# Patient Record
Sex: FEMALE | Race: WHITE | NOT HISPANIC OR LATINO | URBAN - METROPOLITAN AREA
[De-identification: names, ages, dates, MRNs, and addresses within clinical notes are randomized per-mention and may not be internally consistent; named-entity substitution may affect disease eponyms.]

---

## 2017-02-25 ENCOUNTER — EMERGENCY (EMERGENCY)
Facility: HOSPITAL | Age: 77
LOS: 1 days | Discharge: HOME | End: 2017-02-25
Attending: EMERGENCY MEDICINE | Admitting: EMERGENCY MEDICINE
Payer: COMMERCIAL

## 2017-02-25 VITALS
TEMPERATURE: 98 F | WEIGHT: 110.01 LBS | HEIGHT: 66 IN | OXYGEN SATURATION: 96 % | HEART RATE: 86 BPM | DIASTOLIC BLOOD PRESSURE: 81 MMHG | RESPIRATION RATE: 16 BRPM | SYSTOLIC BLOOD PRESSURE: 149 MMHG

## 2017-02-25 VITALS
SYSTOLIC BLOOD PRESSURE: 145 MMHG | TEMPERATURE: 98 F | RESPIRATION RATE: 16 BRPM | DIASTOLIC BLOOD PRESSURE: 65 MMHG | HEART RATE: 84 BPM | OXYGEN SATURATION: 96 %

## 2017-02-25 DIAGNOSIS — K92.1 MELENA: ICD-10-CM

## 2017-02-25 DIAGNOSIS — I35.0 NONRHEUMATIC AORTIC (VALVE) STENOSIS: ICD-10-CM

## 2017-02-25 DIAGNOSIS — Z88.0 ALLERGY STATUS TO PENICILLIN: ICD-10-CM

## 2017-02-25 DIAGNOSIS — R19.5 OTHER FECAL ABNORMALITIES: ICD-10-CM

## 2017-02-25 DIAGNOSIS — I48.91 UNSPECIFIED ATRIAL FIBRILLATION: ICD-10-CM

## 2017-02-25 LAB
ALBUMIN SERPL ELPH-MCNC: 3.3 G/DL — SIGNIFICANT CHANGE UP (ref 3.3–5)
ALP SERPL-CCNC: 93 U/L — SIGNIFICANT CHANGE UP (ref 40–120)
ALT FLD-CCNC: 21 U/L — SIGNIFICANT CHANGE UP (ref 12–78)
ANION GAP SERPL CALC-SCNC: 8 MMOL/L — SIGNIFICANT CHANGE UP (ref 5–17)
APTT BLD: 26.3 SEC — LOW (ref 27.5–37.4)
AST SERPL-CCNC: 19 U/L — SIGNIFICANT CHANGE UP (ref 15–37)
BASOPHILS # BLD AUTO: 0.1 K/UL — SIGNIFICANT CHANGE UP (ref 0–0.2)
BASOPHILS NFR BLD AUTO: 0.7 % — SIGNIFICANT CHANGE UP (ref 0–2)
BILIRUB SERPL-MCNC: 0.2 MG/DL — SIGNIFICANT CHANGE UP (ref 0.2–1.2)
BUN SERPL-MCNC: 14 MG/DL — SIGNIFICANT CHANGE UP (ref 7–23)
CALCIUM SERPL-MCNC: 9.1 MG/DL — SIGNIFICANT CHANGE UP (ref 8.5–10.1)
CHLORIDE SERPL-SCNC: 110 MMOL/L — HIGH (ref 96–108)
CO2 SERPL-SCNC: 25 MMOL/L — SIGNIFICANT CHANGE UP (ref 22–31)
CREAT SERPL-MCNC: 0.37 MG/DL — LOW (ref 0.5–1.3)
EOSINOPHIL # BLD AUTO: 0 K/UL — SIGNIFICANT CHANGE UP (ref 0–0.5)
EOSINOPHIL NFR BLD AUTO: 0.4 % — SIGNIFICANT CHANGE UP (ref 0–6)
GLUCOSE SERPL-MCNC: 93 MG/DL — SIGNIFICANT CHANGE UP (ref 70–99)
HCT VFR BLD CALC: 37.3 % — SIGNIFICANT CHANGE UP (ref 34.5–45)
HGB BLD-MCNC: 11.9 G/DL — SIGNIFICANT CHANGE UP (ref 11.5–15.5)
INR BLD: 1.08 RATIO — SIGNIFICANT CHANGE UP (ref 0.88–1.16)
LIDOCAIN IGE QN: 128 U/L — SIGNIFICANT CHANGE UP (ref 73–393)
LYMPHOCYTES # BLD AUTO: 0.9 K/UL — LOW (ref 1–3.3)
LYMPHOCYTES # BLD AUTO: 10 % — LOW (ref 13–44)
MCHC RBC-ENTMCNC: 27.2 PG — SIGNIFICANT CHANGE UP (ref 27–34)
MCHC RBC-ENTMCNC: 31.8 GM/DL — LOW (ref 32–36)
MCV RBC AUTO: 85.4 FL — SIGNIFICANT CHANGE UP (ref 80–100)
MONOCYTES # BLD AUTO: 0.4 K/UL — SIGNIFICANT CHANGE UP (ref 0–0.9)
MONOCYTES NFR BLD AUTO: 4 % — SIGNIFICANT CHANGE UP (ref 1–9)
NEUTROPHILS # BLD AUTO: 7.9 K/UL — HIGH (ref 1.8–7.4)
NEUTROPHILS NFR BLD AUTO: 85 % — HIGH (ref 43–77)
OB PNL STL: NEGATIVE — SIGNIFICANT CHANGE UP
PLATELET # BLD AUTO: 289 K/UL — SIGNIFICANT CHANGE UP (ref 150–400)
POTASSIUM SERPL-MCNC: 3.5 MMOL/L — SIGNIFICANT CHANGE UP (ref 3.5–5.3)
POTASSIUM SERPL-SCNC: 3.5 MMOL/L — SIGNIFICANT CHANGE UP (ref 3.5–5.3)
PROT SERPL-MCNC: 7.1 G/DL — SIGNIFICANT CHANGE UP (ref 6–8.3)
PROTHROM AB SERPL-ACNC: 12 SEC — SIGNIFICANT CHANGE UP (ref 10–13.1)
RBC # BLD: 4.37 M/UL — SIGNIFICANT CHANGE UP (ref 3.8–5.2)
RBC # FLD: 14.6 % — HIGH (ref 10.3–14.5)
SODIUM SERPL-SCNC: 143 MMOL/L — SIGNIFICANT CHANGE UP (ref 135–145)
WBC # BLD: 9.3 K/UL — SIGNIFICANT CHANGE UP (ref 3.8–10.5)
WBC # FLD AUTO: 9.3 K/UL — SIGNIFICANT CHANGE UP (ref 3.8–10.5)

## 2017-02-25 PROCEDURE — 85610 PROTHROMBIN TIME: CPT

## 2017-02-25 PROCEDURE — 71045 X-RAY EXAM CHEST 1 VIEW: CPT

## 2017-02-25 PROCEDURE — 82272 OCCULT BLD FECES 1-3 TESTS: CPT

## 2017-02-25 PROCEDURE — 86901 BLOOD TYPING SEROLOGIC RH(D): CPT

## 2017-02-25 PROCEDURE — 71010: CPT | Mod: 26

## 2017-02-25 PROCEDURE — 99285 EMERGENCY DEPT VISIT HI MDM: CPT

## 2017-02-25 PROCEDURE — 85730 THROMBOPLASTIN TIME PARTIAL: CPT

## 2017-02-25 PROCEDURE — 83690 ASSAY OF LIPASE: CPT

## 2017-02-25 PROCEDURE — 93005 ELECTROCARDIOGRAM TRACING: CPT

## 2017-02-25 PROCEDURE — 85027 COMPLETE CBC AUTOMATED: CPT

## 2017-02-25 PROCEDURE — 86900 BLOOD TYPING SEROLOGIC ABO: CPT

## 2017-02-25 PROCEDURE — 86850 RBC ANTIBODY SCREEN: CPT

## 2017-02-25 PROCEDURE — 99284 EMERGENCY DEPT VISIT MOD MDM: CPT | Mod: 25

## 2017-02-25 PROCEDURE — 80053 COMPREHEN METABOLIC PANEL: CPT

## 2017-02-25 RX ORDER — SODIUM CHLORIDE 9 MG/ML
1000 INJECTION INTRAMUSCULAR; INTRAVENOUS; SUBCUTANEOUS ONCE
Qty: 0 | Refills: 0 | Status: COMPLETED | OUTPATIENT
Start: 2017-02-25 | End: 2017-02-25

## 2017-02-25 RX ADMIN — SODIUM CHLORIDE 1000 MILLILITER(S): 9 INJECTION INTRAMUSCULAR; INTRAVENOUS; SUBCUTANEOUS at 19:22

## 2017-02-25 NOTE — ED PROVIDER NOTE - PROGRESS NOTE DETAILS
declining protonix labs and imaging explained to patient, abd soft, nt, nd, CT a/p offered to patient to r/o infection, patient declined, wants to f/u with Dr. Oneal and wants to go to her party at [facility].
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
## 2017-02-25 NOTE — ED ADULT NURSE NOTE - OBJECTIVE STATEMENT
76 year old female alert and oriented x3. Pt complaining of dark red stools x2 days.  Pt also complaining of weakness and dizziness.  Lung sounds clear and bilateral. Pt c/o RUQ and LUQ pain no abdominal distension x4 quadrants.  pulse motor sensory intact x4 extremities.  Limited range of motion to right upper extremity s/p previous fracture and surgery. 76 year old female alert and oriented x3. Pt complaining of dark red stools x2 days.  Pt also complaining of weakness and dizziness.  Lung sounds clear and bilateral. Pt no abdominal pain/ distension x4 quadrants.  pulse motor sensory intact x4 extremities.  Limited range of motion to right upper extremity s/p previous fracture and surgery. 76 year old female alert and oriented x3. Pt complaining of dark red stools x2 days.  Pt also complaining of weakness and dizziness.  Lung sounds clear and bilateral. Pt no abdominal pain/ distension x4 quadrants.  pulse motor sensory intact x4 extremities.  Limited range of motion to right upper extremity s/p previous fracture and surgery.  CAM boot to right lower extremity.

## 2017-02-25 NOTE — ED ADULT NURSE REASSESSMENT NOTE - NS ED NURSE REASSESS COMMENT FT1
received patient from off going RN Yue Holden.  patient presents alert and oriented X4. patient denies dizziness, abdominal pain, shortness of breath, chest pain.  patient ambulated in ED, steady gait.  positive b/l pedal pulses
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
## 2017-05-27 ENCOUNTER — INPATIENT (INPATIENT)
Facility: HOSPITAL | Age: 77
LOS: 4 days | Discharge: HOME | DRG: 378 | End: 2017-06-01
Attending: FAMILY MEDICINE | Admitting: FAMILY MEDICINE
Payer: MEDICARE

## 2017-05-27 VITALS
RESPIRATION RATE: 15 BRPM | HEIGHT: 66 IN | DIASTOLIC BLOOD PRESSURE: 50 MMHG | OXYGEN SATURATION: 94 % | WEIGHT: 110.01 LBS | SYSTOLIC BLOOD PRESSURE: 111 MMHG | TEMPERATURE: 98 F

## 2017-05-27 DIAGNOSIS — K62.5 HEMORRHAGE OF ANUS AND RECTUM: ICD-10-CM

## 2017-05-27 LAB
ALBUMIN SERPL ELPH-MCNC: 2.5 G/DL — LOW (ref 3.3–5)
ALLERGY+IMMUNOLOGY DIAG STUDY NOTE: SIGNIFICANT CHANGE UP
ALP SERPL-CCNC: 82 U/L — SIGNIFICANT CHANGE UP (ref 40–120)
ALT FLD-CCNC: 13 U/L — SIGNIFICANT CHANGE UP (ref 12–78)
ANION GAP SERPL CALC-SCNC: 11 MMOL/L — SIGNIFICANT CHANGE UP (ref 5–17)
APPEARANCE UR: CLEAR — SIGNIFICANT CHANGE UP
APTT BLD: 22.4 SEC — LOW (ref 27.5–37.4)
AST SERPL-CCNC: 10 U/L — LOW (ref 15–37)
BACTERIA # UR AUTO: ABNORMAL
BASOPHILS # BLD AUTO: 0.1 K/UL — SIGNIFICANT CHANGE UP (ref 0–0.2)
BASOPHILS NFR BLD AUTO: 0.4 % — SIGNIFICANT CHANGE UP (ref 0–2)
BILIRUB SERPL-MCNC: 0.2 MG/DL — SIGNIFICANT CHANGE UP (ref 0.2–1.2)
BILIRUB UR-MCNC: NEGATIVE — SIGNIFICANT CHANGE UP
BLD GP AB SCN SERPL QL: ABNORMAL
BUN SERPL-MCNC: 31 MG/DL — HIGH (ref 7–23)
CALCIUM SERPL-MCNC: 8.4 MG/DL — LOW (ref 8.5–10.1)
CHLORIDE SERPL-SCNC: 110 MMOL/L — HIGH (ref 96–108)
CO2 SERPL-SCNC: 24 MMOL/L — SIGNIFICANT CHANGE UP (ref 22–31)
COLOR SPEC: YELLOW — SIGNIFICANT CHANGE UP
CREAT SERPL-MCNC: 0.57 MG/DL — SIGNIFICANT CHANGE UP (ref 0.5–1.3)
DIFF PNL FLD: ABNORMAL
DIR ANTIGLOB POLYSPECIFIC INTERPRETATION: SIGNIFICANT CHANGE UP
EOSINOPHIL # BLD AUTO: 0.2 K/UL — SIGNIFICANT CHANGE UP (ref 0–0.5)
EOSINOPHIL NFR BLD AUTO: 0.9 % — SIGNIFICANT CHANGE UP (ref 0–6)
EPI CELLS # UR: SIGNIFICANT CHANGE UP
GLUCOSE SERPL-MCNC: 174 MG/DL — HIGH (ref 70–99)
GLUCOSE UR QL: NEGATIVE — SIGNIFICANT CHANGE UP
HCT VFR BLD CALC: 26.5 % — LOW (ref 34.5–45)
HCT VFR BLD CALC: 28.3 % — LOW (ref 34.5–45)
HGB BLD-MCNC: 8.4 G/DL — LOW (ref 11.5–15.5)
HGB BLD-MCNC: 9 G/DL — LOW (ref 11.5–15.5)
INR BLD: 1.16 RATIO — SIGNIFICANT CHANGE UP (ref 0.88–1.16)
KETONES UR-MCNC: NEGATIVE — SIGNIFICANT CHANGE UP
LACTATE SERPL-SCNC: 0.9 MMOL/L — SIGNIFICANT CHANGE UP (ref 0.7–2)
LACTATE SERPL-SCNC: 2.4 MMOL/L — HIGH (ref 0.7–2)
LEUKOCYTE ESTERASE UR-ACNC: ABNORMAL
LYMPHOCYTES # BLD AUTO: 1.5 K/UL — SIGNIFICANT CHANGE UP (ref 1–3.3)
LYMPHOCYTES # BLD AUTO: 8.5 % — LOW (ref 13–44)
MCHC RBC-ENTMCNC: 27.3 PG — SIGNIFICANT CHANGE UP (ref 27–34)
MCHC RBC-ENTMCNC: 27.9 PG — SIGNIFICANT CHANGE UP (ref 27–34)
MCHC RBC-ENTMCNC: 31.6 GM/DL — LOW (ref 32–36)
MCHC RBC-ENTMCNC: 31.9 GM/DL — LOW (ref 32–36)
MCV RBC AUTO: 86.2 FL — SIGNIFICANT CHANGE UP (ref 80–100)
MCV RBC AUTO: 87.4 FL — SIGNIFICANT CHANGE UP (ref 80–100)
MONOCYTES # BLD AUTO: 1 K/UL — HIGH (ref 0–0.9)
MONOCYTES NFR BLD AUTO: 5.9 % — SIGNIFICANT CHANGE UP (ref 1–9)
NEUTROPHILS # BLD AUTO: 14.7 K/UL — HIGH (ref 1.8–7.4)
NEUTROPHILS NFR BLD AUTO: 84.3 % — HIGH (ref 43–77)
NITRITE UR-MCNC: NEGATIVE — SIGNIFICANT CHANGE UP
NT-PROBNP SERPL-SCNC: 679 PG/ML — HIGH (ref 0–450)
PH UR: 7 — SIGNIFICANT CHANGE UP (ref 5–8)
PLATELET # BLD AUTO: 334 K/UL — SIGNIFICANT CHANGE UP (ref 150–400)
PLATELET # BLD AUTO: 373 K/UL — SIGNIFICANT CHANGE UP (ref 150–400)
POTASSIUM SERPL-MCNC: 4 MMOL/L — SIGNIFICANT CHANGE UP (ref 3.5–5.3)
POTASSIUM SERPL-SCNC: 4 MMOL/L — SIGNIFICANT CHANGE UP (ref 3.5–5.3)
PROT SERPL-MCNC: 5.7 G/DL — LOW (ref 6–8.3)
PROT UR-MCNC: NEGATIVE — SIGNIFICANT CHANGE UP
PROTHROM AB SERPL-ACNC: 12.7 SEC — SIGNIFICANT CHANGE UP (ref 9.8–12.7)
RBC # BLD: 3.08 M/UL — LOW (ref 3.8–5.2)
RBC # BLD: 3.24 M/UL — LOW (ref 3.8–5.2)
RBC # FLD: 14.9 % — HIGH (ref 10.3–14.5)
RBC # FLD: 15.2 % — HIGH (ref 10.3–14.5)
RBC CASTS # UR COMP ASSIST: ABNORMAL /HPF (ref 0–4)
SODIUM SERPL-SCNC: 145 MMOL/L — SIGNIFICANT CHANGE UP (ref 135–145)
SP GR SPEC: 1 — LOW (ref 1.01–1.02)
UROBILINOGEN FLD QL: NEGATIVE — SIGNIFICANT CHANGE UP
WBC # BLD: 14.9 K/UL — HIGH (ref 3.8–10.5)
WBC # BLD: 17.4 K/UL — HIGH (ref 3.8–10.5)
WBC # FLD AUTO: 14.9 K/UL — HIGH (ref 3.8–10.5)
WBC # FLD AUTO: 17.4 K/UL — HIGH (ref 3.8–10.5)
WBC UR QL: ABNORMAL

## 2017-05-27 PROCEDURE — 74020: CPT | Mod: 26

## 2017-05-27 PROCEDURE — 93010 ELECTROCARDIOGRAM REPORT: CPT

## 2017-05-27 PROCEDURE — 99285 EMERGENCY DEPT VISIT HI MDM: CPT

## 2017-05-27 PROCEDURE — 71010: CPT | Mod: 26

## 2017-05-27 RX ORDER — MORPHINE SULFATE 50 MG/1
1 CAPSULE, EXTENDED RELEASE ORAL
Qty: 0 | Refills: 0 | Status: DISCONTINUED | OUTPATIENT
Start: 2017-05-27 | End: 2017-06-01

## 2017-05-27 RX ORDER — SODIUM CHLORIDE 9 MG/ML
3 INJECTION INTRAMUSCULAR; INTRAVENOUS; SUBCUTANEOUS ONCE
Qty: 0 | Refills: 0 | Status: COMPLETED | OUTPATIENT
Start: 2017-05-27 | End: 2017-05-27

## 2017-05-27 RX ORDER — FAMOTIDINE 10 MG/ML
20 INJECTION INTRAVENOUS EVERY 12 HOURS
Qty: 0 | Refills: 0 | Status: DISCONTINUED | OUTPATIENT
Start: 2017-05-27 | End: 2017-06-01

## 2017-05-27 RX ORDER — PANTOPRAZOLE SODIUM 20 MG/1
40 TABLET, DELAYED RELEASE ORAL ONCE
Qty: 0 | Refills: 0 | Status: COMPLETED | OUTPATIENT
Start: 2017-05-27 | End: 2017-05-27

## 2017-05-27 RX ADMIN — FAMOTIDINE 20 MILLIGRAM(S): 10 INJECTION INTRAVENOUS at 22:47

## 2017-05-27 RX ADMIN — SODIUM CHLORIDE 3 MILLILITER(S): 9 INJECTION INTRAMUSCULAR; INTRAVENOUS; SUBCUTANEOUS at 19:15

## 2017-05-27 RX ADMIN — MORPHINE SULFATE 1 MILLIGRAM(S): 50 CAPSULE, EXTENDED RELEASE ORAL at 22:43

## 2017-05-27 RX ADMIN — MORPHINE SULFATE 1 MILLIGRAM(S): 50 CAPSULE, EXTENDED RELEASE ORAL at 22:55
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
## 2017-05-27 NOTE — ED PROVIDER NOTE - CONSTITUTIONAL, MLM
normal... Well appearing, pale  awake, alert, oriented to person, place, time/situation and in no apparent distress.
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
## 2017-05-27 NOTE — ED PROVIDER NOTE - CRITICAL CARE INDICATION, MLM
patient was critically ill... Patient was critically ill with a high probability of imminent or life threatening deterioration heavy bleeding abd pian hypotension

## 2017-05-27 NOTE — ED PROVIDER NOTE - OBJECTIVE STATEMENT
PT is a 75 yo f who has hx of idiopathic diarrhea gib gets fe transfusions pmd is her daughter dr Mishra and gi is dr Oneal.  she last had gib 1 year ago was well until today when she had a near syncopal episode. this afternoon she began to have abd cramps and passed a large bloody bm  no cp no sob

## 2017-05-27 NOTE — PATIENT PROFILE ADULT. - NS TRANSFER PATIENT BELONGINGS
Other belongings/purse/wallet/Clothing/Jewelry/Money (specify)/Electronic Device (specify)/Cell Phone/PDA (specify)/Wrist Watch

## 2017-05-27 NOTE — ED PROVIDER NOTE - ENMT, MLM
Airway patent, Nasal mucosa clear. Mouth with pale mucosa. Throat has no vesicles, no oropharyngeal exudates and uvula is midline.

## 2017-05-27 NOTE — ED ADULT NURSE NOTE - OBJECTIVE STATEMENT
c/o rectal bleeding /left sided abd pain today.  states bleed a lot today and has hx of rectal bleeding c/o rectal bleeding /left sided abd pain today.  states bleed a lot today and has hx of rectal bleeding.  rt lower leg foot/leg in immobilizer boot in -place
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
## 2017-05-27 NOTE — ED PROVIDER NOTE - CRITICAL CARE PROVIDED
additional history taking/interpretation of diagnostic studies/documentation/direct patient care (not related to procedure)/consult w/ pt's family directly relating to pts condition/consultation with other physicians

## 2017-05-28 DIAGNOSIS — K62.5 HEMORRHAGE OF ANUS AND RECTUM: ICD-10-CM

## 2017-05-28 LAB
ANION GAP SERPL CALC-SCNC: 8 MMOL/L — SIGNIFICANT CHANGE UP (ref 5–17)
BUN SERPL-MCNC: 16 MG/DL — SIGNIFICANT CHANGE UP (ref 7–23)
CALCIUM SERPL-MCNC: 8.3 MG/DL — LOW (ref 8.5–10.1)
CHLORIDE SERPL-SCNC: 111 MMOL/L — HIGH (ref 96–108)
CO2 SERPL-SCNC: 27 MMOL/L — SIGNIFICANT CHANGE UP (ref 22–31)
CREAT SERPL-MCNC: 0.29 MG/DL — LOW (ref 0.5–1.3)
GLUCOSE SERPL-MCNC: 92 MG/DL — SIGNIFICANT CHANGE UP (ref 70–99)
HBA1C BLD-MCNC: 5.3 % — SIGNIFICANT CHANGE UP (ref 4–5.6)
HCT VFR BLD CALC: 23.4 % — LOW (ref 34.5–45)
HCT VFR BLD CALC: 25.6 % — LOW (ref 34.5–45)
HCT VFR BLD CALC: 27.8 % — LOW (ref 34.5–45)
HGB BLD-MCNC: 7.5 G/DL — LOW (ref 11.5–15.5)
HGB BLD-MCNC: 8.3 G/DL — LOW (ref 11.5–15.5)
HGB BLD-MCNC: 8.7 G/DL — LOW (ref 11.5–15.5)
LACTATE SERPL-SCNC: 0.6 MMOL/L — LOW (ref 0.7–2)
MCHC RBC-ENTMCNC: 26.8 PG — LOW (ref 27–34)
MCHC RBC-ENTMCNC: 27.3 PG — SIGNIFICANT CHANGE UP (ref 27–34)
MCHC RBC-ENTMCNC: 27.8 PG — SIGNIFICANT CHANGE UP (ref 27–34)
MCHC RBC-ENTMCNC: 31.3 GM/DL — LOW (ref 32–36)
MCHC RBC-ENTMCNC: 32.2 GM/DL — SIGNIFICANT CHANGE UP (ref 32–36)
MCHC RBC-ENTMCNC: 32.6 GM/DL — SIGNIFICANT CHANGE UP (ref 32–36)
MCV RBC AUTO: 83.7 FL — SIGNIFICANT CHANGE UP (ref 80–100)
MCV RBC AUTO: 85.6 FL — SIGNIFICANT CHANGE UP (ref 80–100)
MCV RBC AUTO: 86.4 FL — SIGNIFICANT CHANGE UP (ref 80–100)
PLATELET # BLD AUTO: 252 K/UL — SIGNIFICANT CHANGE UP (ref 150–400)
PLATELET # BLD AUTO: 275 K/UL — SIGNIFICANT CHANGE UP (ref 150–400)
PLATELET # BLD AUTO: 293 K/UL — SIGNIFICANT CHANGE UP (ref 150–400)
POTASSIUM SERPL-MCNC: 3.5 MMOL/L — SIGNIFICANT CHANGE UP (ref 3.5–5.3)
POTASSIUM SERPL-SCNC: 3.5 MMOL/L — SIGNIFICANT CHANGE UP (ref 3.5–5.3)
RBC # BLD: 2.7 M/UL — LOW (ref 3.8–5.2)
RBC # BLD: 3.06 M/UL — LOW (ref 3.8–5.2)
RBC # BLD: 3.25 M/UL — LOW (ref 3.8–5.2)
RBC # FLD: 13.8 % — SIGNIFICANT CHANGE UP (ref 10.3–14.5)
RBC # FLD: 14 % — SIGNIFICANT CHANGE UP (ref 10.3–14.5)
RBC # FLD: 15.1 % — HIGH (ref 10.3–14.5)
SODIUM SERPL-SCNC: 146 MMOL/L — HIGH (ref 135–145)
WBC # BLD: 5.8 K/UL — SIGNIFICANT CHANGE UP (ref 3.8–10.5)
WBC # BLD: 6.3 K/UL — SIGNIFICANT CHANGE UP (ref 3.8–10.5)
WBC # BLD: 7.6 K/UL — SIGNIFICANT CHANGE UP (ref 3.8–10.5)
WBC # FLD AUTO: 5.8 K/UL — SIGNIFICANT CHANGE UP (ref 3.8–10.5)
WBC # FLD AUTO: 6.3 K/UL — SIGNIFICANT CHANGE UP (ref 3.8–10.5)
WBC # FLD AUTO: 7.6 K/UL — SIGNIFICANT CHANGE UP (ref 3.8–10.5)

## 2017-05-28 PROCEDURE — 99233 SBSQ HOSP IP/OBS HIGH 50: CPT

## 2017-05-28 RX ORDER — SODIUM CHLORIDE 9 MG/ML
1000 INJECTION INTRAMUSCULAR; INTRAVENOUS; SUBCUTANEOUS
Qty: 0 | Refills: 0 | Status: DISCONTINUED | OUTPATIENT
Start: 2017-05-28 | End: 2017-05-29

## 2017-05-28 RX ADMIN — SODIUM CHLORIDE 75 MILLILITER(S): 9 INJECTION INTRAMUSCULAR; INTRAVENOUS; SUBCUTANEOUS at 09:26

## 2017-05-28 RX ADMIN — MORPHINE SULFATE 1 MILLIGRAM(S): 50 CAPSULE, EXTENDED RELEASE ORAL at 05:27

## 2017-05-28 RX ADMIN — FAMOTIDINE 20 MILLIGRAM(S): 10 INJECTION INTRAVENOUS at 22:31

## 2017-05-28 RX ADMIN — MORPHINE SULFATE 1 MILLIGRAM(S): 50 CAPSULE, EXTENDED RELEASE ORAL at 19:51

## 2017-05-28 RX ADMIN — SODIUM CHLORIDE 75 MILLILITER(S): 9 INJECTION INTRAMUSCULAR; INTRAVENOUS; SUBCUTANEOUS at 22:31

## 2017-05-28 RX ADMIN — MORPHINE SULFATE 1 MILLIGRAM(S): 50 CAPSULE, EXTENDED RELEASE ORAL at 22:31

## 2017-05-28 RX ADMIN — MORPHINE SULFATE 1 MILLIGRAM(S): 50 CAPSULE, EXTENDED RELEASE ORAL at 02:15

## 2017-05-28 RX ADMIN — MORPHINE SULFATE 1 MILLIGRAM(S): 50 CAPSULE, EXTENDED RELEASE ORAL at 01:43

## 2017-05-28 RX ADMIN — MORPHINE SULFATE 1 MILLIGRAM(S): 50 CAPSULE, EXTENDED RELEASE ORAL at 01:15

## 2017-05-28 RX ADMIN — MORPHINE SULFATE 1 MILLIGRAM(S): 50 CAPSULE, EXTENDED RELEASE ORAL at 14:14

## 2017-05-28 RX ADMIN — FAMOTIDINE 20 MILLIGRAM(S): 10 INJECTION INTRAVENOUS at 09:35

## 2017-05-28 RX ADMIN — MORPHINE SULFATE 1 MILLIGRAM(S): 50 CAPSULE, EXTENDED RELEASE ORAL at 22:45

## 2017-05-28 RX ADMIN — MORPHINE SULFATE 1 MILLIGRAM(S): 50 CAPSULE, EXTENDED RELEASE ORAL at 09:09

## 2017-05-28 RX ADMIN — MORPHINE SULFATE 1 MILLIGRAM(S): 50 CAPSULE, EXTENDED RELEASE ORAL at 01:00

## 2017-05-28 RX ADMIN — MORPHINE SULFATE 1 MILLIGRAM(S): 50 CAPSULE, EXTENDED RELEASE ORAL at 20:05

## 2017-05-28 RX ADMIN — MORPHINE SULFATE 1 MILLIGRAM(S): 50 CAPSULE, EXTENDED RELEASE ORAL at 05:40

## 2017-05-28 RX ADMIN — MORPHINE SULFATE 1 MILLIGRAM(S): 50 CAPSULE, EXTENDED RELEASE ORAL at 14:29

## 2017-05-28 RX ADMIN — MORPHINE SULFATE 1 MILLIGRAM(S): 50 CAPSULE, EXTENDED RELEASE ORAL at 08:36

## 2017-05-28 NOTE — CONSULT NOTE ADULT - ATTENDING COMMENTS
pt seen and examined  chart reviewed  s/p 1 unit prbc for declining hemoglobin (no further hematochezia)  s/p 1 unit platelet for chronic asa use  either small bowel or colonic diverticular bleed  recommend  serial cbc  transfuse as needed  npo  may need endoscopic evaluation if bleeding persist but will need cardiac clearance given severe AS and severe pulm htn

## 2017-05-28 NOTE — PROGRESS NOTE ADULT - SUBJECTIVE AND OBJECTIVE BOX
24 hour events: feels better after getting blood transfusion  no other complaints  no bleeding since coming to the hospital    REVIEW OF SYSTEMS  Constitutional: No fever, chills, fatigue  Neuro: No headache, numbness, weakness  Resp: No cough, wheezing, shortness of breath  CVS: No chest pain, palpitations, leg swelling  GI: left lower abdominal pain, no nausea, vomiting, diarrhea   : No dysuria, frequency, incontinence  Skin: No itching, burning, rashes, or lesions   Msk: No joint pain or swelling  Psych: No depression, anxiety, mood swings    T(F): 98.1, Max: 98.1 ( @ 19:00)  HR: 85 (74 - 94)  BP: 131/58 (89/51 - 131/60)  RR: 41 (11 - 41)  SpO2: 100% (94% - 100%)    I&O's Summary  I & Os for 24h ending  @ 07:00  =============================================  IN: 200 ml / OUT: 600 ml / NET: -400 ml    I & Os for current day (as of  @ 12:32)  =============================================  IN: 380 ml / OUT: 800 ml / NET: -420 ml    PHYSICAL EXAM  General: elderly female, NAD  CNS: AAO x 3, no focal deficits  HEENT: pupils reactive, dry mucosa  Resp: clear b/l  CVS: S1S2, regular, 4/6 SM  Abd: soft, LLQ tenderness, +BS, no rebound/guarding  Ext: no edema, right leg in a brace  Skin: warm    MEDICATIONS  morphine  - Injectable IV Push PRN  famotidine Injectable IV Push  sodium chloride 0.9%. IV Continuous                        7.5    7.6   )-----------( 252      ( 28 May 2017 07:35 )             23.4         146<H>  |  111<H>  |  16  ----------------------------<  92  3.5   |  27  |  0.29<L>    Ca    8.3<L>      28 May 2017 07:35    TPro  5.7<L>  /  Alb  2.5<L>  /  TBili  0.2  /  DBili  x   /  AST  10<L>  /  ALT  13  /  AlkPhos  82      Lactate 0.6            @ 07:35    Lactate 0.9            @ 21:45    Lactate 2.4            @ 19:08    PT/INR - ( 27 May 2017 19:08 )   PT: 12.7 sec;   INR: 1.16 ratio       PTT - ( 27 May 2017 19:08 )  PTT:22.4 sec  Urinalysis Basic - ( 27 May 2017 22:05 )    Color: Yellow / Appearance: Clear / S.005 / pH: x  Gluc: x / Ketone: Negative  / Bili: Negative / Urobili: Negative   Blood: x / Protein: Negative / Nitrite: Negative   Leuk Esterase: Trace / RBC: 6-10 /HPF / WBC 6-10   Sq Epi: x / Non Sq Epi: Occasional / Bacteria: Moderate    CENTRAL LINE: N          LOPEZ: N                  A-LINE: N                    GLOBAL ISSUE/BEST PRACTICE  Analgesia: Y  Sedation: NA  CAM-ICU: neg  HOB elevation: yes  Stress ulcer prophylaxis: Y  VTE prophylaxis: N (active bleeding)  Glycemic control: NA  Nutrition: N    CODE STATUS: full

## 2017-05-28 NOTE — DIETITIAN INITIAL EVALUATION ADULT. - OTHER INFO
Pt states that her UBW is 125# and currently weighs 110#. Wt in hospital is 124.3#. Pt also states that she has diarrhea after eating. She feels fine when she doesn't eat anything. She wishes she doesn't have to eat. Pt can NOT tolerate Ensure or any other supplement. Pt also states that she has had many upper endos/colons in past without any reason as to why she is unable to tolerate food. Pt currently receiving blood transfusion at time of interview this am.
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
## 2017-05-28 NOTE — CONSULT NOTE ADULT - PROBLEM SELECTOR RECOMMENDATION 2
will monitor stools for evidence of further GIB  off ASA  currently being transfused  transfuse as needed
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
## 2017-05-29 LAB
ANION GAP SERPL CALC-SCNC: 13 MMOL/L — SIGNIFICANT CHANGE UP (ref 5–17)
BUN SERPL-MCNC: 10 MG/DL — SIGNIFICANT CHANGE UP (ref 7–23)
CALCIUM SERPL-MCNC: 8.3 MG/DL — LOW (ref 8.5–10.1)
CHLORIDE SERPL-SCNC: 109 MMOL/L — HIGH (ref 96–108)
CO2 SERPL-SCNC: 24 MMOL/L — SIGNIFICANT CHANGE UP (ref 22–31)
CREAT SERPL-MCNC: 0.3 MG/DL — LOW (ref 0.5–1.3)
GLUCOSE SERPL-MCNC: 75 MG/DL — SIGNIFICANT CHANGE UP (ref 70–99)
HCT VFR BLD CALC: 26.3 % — LOW (ref 34.5–45)
HGB BLD-MCNC: 8.4 G/DL — LOW (ref 11.5–15.5)
MAGNESIUM SERPL-MCNC: 2 MG/DL — SIGNIFICANT CHANGE UP (ref 1.6–2.6)
MCHC RBC-ENTMCNC: 27.6 PG — SIGNIFICANT CHANGE UP (ref 27–34)
MCHC RBC-ENTMCNC: 31.9 GM/DL — LOW (ref 32–36)
MCV RBC AUTO: 86.6 FL — SIGNIFICANT CHANGE UP (ref 80–100)
PHOSPHATE SERPL-MCNC: 3.1 MG/DL — SIGNIFICANT CHANGE UP (ref 2.5–4.5)
PLATELET # BLD AUTO: 266 K/UL — SIGNIFICANT CHANGE UP (ref 150–400)
POTASSIUM SERPL-MCNC: 3.4 MMOL/L — LOW (ref 3.5–5.3)
POTASSIUM SERPL-SCNC: 3.4 MMOL/L — LOW (ref 3.5–5.3)
RBC # BLD: 3.04 M/UL — LOW (ref 3.8–5.2)
RBC # FLD: 14.2 % — SIGNIFICANT CHANGE UP (ref 10.3–14.5)
SODIUM SERPL-SCNC: 146 MMOL/L — HIGH (ref 135–145)
WBC # BLD: 5.9 K/UL — SIGNIFICANT CHANGE UP (ref 3.8–10.5)
WBC # FLD AUTO: 5.9 K/UL — SIGNIFICANT CHANGE UP (ref 3.8–10.5)

## 2017-05-29 PROCEDURE — 99291 CRITICAL CARE FIRST HOUR: CPT

## 2017-05-29 PROCEDURE — 99233 SBSQ HOSP IP/OBS HIGH 50: CPT

## 2017-05-29 RX ORDER — SODIUM CHLORIDE 9 MG/ML
1000 INJECTION, SOLUTION INTRAVENOUS
Qty: 0 | Refills: 0 | Status: DISCONTINUED | OUTPATIENT
Start: 2017-05-29 | End: 2017-05-30

## 2017-05-29 RX ORDER — POTASSIUM CHLORIDE 20 MEQ
20 PACKET (EA) ORAL
Qty: 0 | Refills: 0 | Status: COMPLETED | OUTPATIENT
Start: 2017-05-29 | End: 2017-05-29

## 2017-05-29 RX ORDER — METOPROLOL TARTRATE 50 MG
5 TABLET ORAL ONCE
Qty: 0 | Refills: 0 | Status: COMPLETED | OUTPATIENT
Start: 2017-05-29 | End: 2017-05-29

## 2017-05-29 RX ADMIN — MORPHINE SULFATE 1 MILLIGRAM(S): 50 CAPSULE, EXTENDED RELEASE ORAL at 13:27

## 2017-05-29 RX ADMIN — MORPHINE SULFATE 1 MILLIGRAM(S): 50 CAPSULE, EXTENDED RELEASE ORAL at 03:35

## 2017-05-29 RX ADMIN — SODIUM CHLORIDE 75 MILLILITER(S): 9 INJECTION INTRAMUSCULAR; INTRAVENOUS; SUBCUTANEOUS at 13:04

## 2017-05-29 RX ADMIN — MORPHINE SULFATE 1 MILLIGRAM(S): 50 CAPSULE, EXTENDED RELEASE ORAL at 20:20

## 2017-05-29 RX ADMIN — MORPHINE SULFATE 1 MILLIGRAM(S): 50 CAPSULE, EXTENDED RELEASE ORAL at 08:20

## 2017-05-29 RX ADMIN — MORPHINE SULFATE 1 MILLIGRAM(S): 50 CAPSULE, EXTENDED RELEASE ORAL at 13:43

## 2017-05-29 RX ADMIN — FAMOTIDINE 20 MILLIGRAM(S): 10 INJECTION INTRAVENOUS at 10:56

## 2017-05-29 RX ADMIN — Medication 20 MILLIEQUIVALENT(S): at 10:56

## 2017-05-29 RX ADMIN — Medication 5 MILLIGRAM(S): at 09:23

## 2017-05-29 RX ADMIN — MORPHINE SULFATE 1 MILLIGRAM(S): 50 CAPSULE, EXTENDED RELEASE ORAL at 03:17

## 2017-05-29 RX ADMIN — MORPHINE SULFATE 1 MILLIGRAM(S): 50 CAPSULE, EXTENDED RELEASE ORAL at 08:35

## 2017-05-29 RX ADMIN — MORPHINE SULFATE 1 MILLIGRAM(S): 50 CAPSULE, EXTENDED RELEASE ORAL at 19:50

## 2017-05-29 RX ADMIN — FAMOTIDINE 20 MILLIGRAM(S): 10 INJECTION INTRAVENOUS at 22:15

## 2017-05-29 RX ADMIN — MORPHINE SULFATE 1 MILLIGRAM(S): 50 CAPSULE, EXTENDED RELEASE ORAL at 22:01

## 2017-05-29 RX ADMIN — MORPHINE SULFATE 1 MILLIGRAM(S): 50 CAPSULE, EXTENDED RELEASE ORAL at 22:31

## 2017-05-29 RX ADMIN — Medication 30 MILLILITER(S): at 13:22

## 2017-05-29 RX ADMIN — Medication 20 MILLIEQUIVALENT(S): at 13:01

## 2017-05-29 NOTE — PROGRESS NOTE ADULT - ASSESSMENT
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
## 2017-05-29 NOTE — PROGRESS NOTE ADULT - SUBJECTIVE AND OBJECTIVE BOX
INTERVAL HPI/OVERNIGHT EVENTS:  Pt seen and examined at bedside.  No further bleeding as pt has not had a BM since admission.  Remains NPO    MEDICATIONS  (STANDING):  famotidine Injectable 20milliGRAM(s) IV Push every 12 hours  sodium chloride 0.9%. 1000milliLiter(s) IV Continuous <Continuous>  potassium chloride    Tablet ER 20milliEquivalent(s) Oral every 2 hours    MEDICATIONS  (PRN):  morphine  - Injectable 1milliGRAM(s) IV Push every 1 hour PRN Moderate Pain (4 - 6)      Allergies    penicillin (Unknown)  Plavix (Unknown)  sulfa drugs (Unknown)    Intolerances        ROS:   General:  No wt loss, fevers, chills, night sweats, fatigue,   Eyes:  Good vision, no reported pain  ENT:  No sore throat, pain, runny nose, dysphagia  CV:  No pain, palpitations, hypo/hypertension  Resp:  No dyspnea, cough, tachypnea, wheezing  GI:  No pain, No nausea, No vomiting, No diarrhea, No constipation, No weight loss, No fever, No pruritis, No rectal bleeding, No tarry stools, No dysphagia,  :  No pain, bleeding, incontinence, nocturia  Muscle:  No pain, weakness  Neuro:  No weakness, tingling, memory problems  Psych:  No fatigue, insomnia, mood problems, depression  Endocrine:  No polyuria, polydipsia, cold/heat intolerance  Heme:  No petechiae, ecchymosis, easy bruisability  Skin:  No rash, tattoos, scars, edema      PHYSICAL EXAM:   Vital Signs:  Vital Signs Last 24 Hrs  T(C): 36.5, Max: 36.8 ( @ 15:37)  T(F): 97.7, Max: 98.2 ( @ 15:37)  HR: 121 (66 - 121)  BP: 113/57 (95/50 - 138/56)  BP(mean): 79 (70 - 85)  RR: 16 (11 - 126)  SpO2: 96% (96% - 100%)  Daily     Daily Weight in k.1 (29 May 2017 07:00)    GENERAL:  Appears stated age, well-groomed, well-nourished, no distress  HEENT:  NC/AT,  conjunctivae clear and pink, no thyromegaly, nodules, adenopathy, no JVD, sclera -anicteric  CHEST:  Full & symmetric excursion, no increased effort, breath sounds clear  HEART:  Regular rhythm, S1, S2, no murmur/rub/S3/S4, no abdominal bruit, no edema  ABDOMEN:  Soft, non-tender, non-distended, normoactive bowel sounds,  no masses ,no hepato-splenomegaly, no signs of chronic liver disease  EXTEREMITIES:  no cyanosis,clubbing or edema  SKIN:  No rash/erythema/ecchymoses/petechiae/wounds/abscess/warm/dry  NEURO:  Alert, oriented, no asterixis, no tremor, no encephalopathy      LABS:                        8.4    5.9   )-----------( 266      ( 29 May 2017 07:04 )             26.3     05-    146<H>  |  109<H>  |  10  ----------------------------<  75  3.4<L>   |  24  |  0.30<L>    Ca    8.3<L>      29 May 2017 07:04  Phos  3.1     -  Mg     2.0     -    TPro  5.7<L>  /  Alb  2.5<L>  /  TBili  0.2  /  DBili  x   /  AST  10<L>  /  ALT  13  /  AlkPhos  82  -    PT/INR - ( 27 May 2017 19:08 )   PT: 12.7 sec;   INR: 1.16 ratio         PTT - ( 27 May 2017 19:08 )  PTT:22.4 sec  Urinalysis Basic - ( 27 May 2017 22:05 )    Color: Yellow / Appearance: Clear / S.005 / pH: x  Gluc: x / Ketone: Negative  / Bili: Negative / Urobili: Negative   Blood: x / Protein: Negative / Nitrite: Negative   Leuk Esterase: Trace / RBC: 6-10 /HPF / WBC 6-10   Sq Epi: x / Non Sq Epi: Occasional / Bacteria: Moderate        RADIOLOGY & ADDITIONAL TESTS:

## 2017-05-29 NOTE — PROGRESS NOTE ADULT - SUBJECTIVE AND OBJECTIVE BOX
24 hour events: transfused 1 PRBC yesterday  Afib with RVR this am, normotensive    Review of Systems:  Constitutional: No fever, chills, fatigue  Neuro: No headache, numbness, weakness  Resp: No cough, wheezing, shortness of breath  CVS: No chest pain, palpitations, leg swelling  GI: No abdominal pain, nausea, vomiting, diarrhea   : No dysuria, frequency, incontinence  Skin: No itching, burning, rashes, or lesions   Msk: No joint pain or swelling  Psych: No depression, anxiety, mood swings    T(F): 97.7, Max: 98.2 (05-28 @ 15:37)  HR: 81 (66 - 85)  BP: 113/57 (89/51 - 138/56)  RR: 13 (11 - 126)  SpO2: 98% (96% - 100%)    I&O's Summary    I & Os for current day (as of 29 May 2017 09:06)  =============================================  IN: 1730 ml / OUT: 3400 ml / NET: -1670 ml      Physical Exam:   Gen:  Neuro:  HEENT:  Resp:  CVS:  Abd:  Ext:  Skin:    Meds:  morphine  - Injectable IV Push PRN  famotidine Injectable IV Push  sodium chloride 0.9%. IV Continuous  potassium chloride    Tablet ER Oral                        8.4    5.9   )-----------( 266      ( 29 May 2017 07:04 )             26.3       05-29    146<H>  |  109<H>  |  10  ----------------------------<  75  3.4<L>   |  24  |  0.30<L>    Ca    8.3<L>      29 May 2017 07:04  Phos  3.1     05-29  Mg     2.0     05-29    TPro  5.7<L>  /  Alb  2.5<L>  /  TBili  0.2  /  DBili  x   /  AST  10<L>  /  ALT  13  /  AlkPhos  82  05-27    Radiology: ***    Bedside ultrasound: ***    CENTRAL LINE: N/Y          DATE INSERTED:              REMOVE: Y/N  LOPEZ: N/Y                       DATE INSERTED:              REMOVE: Y/N  A-LINE: N/Y                       DATE INSERTED:              REMOVE: Y/N    GLOBAL ISSUE/BEST PRACTICE:  Analgesia:  Sedation:  CAM-ICU:   HOB elevation: yes  Stress ulcer prophylaxis:  VTE prophylaxis:  Glycemic control:  Nutrition:    CODE STATUS: *** 24 hour events: transfused 1 PRBC yesterday  Afib with RVR this am, normotensive, received lopressor 5 IV without improvement, then spontaneous resolved later  episode of moderate melena this am with trace amount of mahogony    GI: +L sided abdominal pain    T(F): 97.7, Max: 98.2 (05-28 @ 15:37)  HR: 81 (66 - 85)  BP: 113/57 (89/51 - 138/56)  RR: 13 (11 - 126)  SpO2: 98% (96% - 100%)    I&O's Summary    I & Os for current day (as of 29 May 2017 09:06)  =============================================  IN: 1730 ml / OUT: 3400 ml / NET: -1670 ml      Physical Exam:   Gen: NAD  Neuro: A and O x3  Resp: CTABL  CVS: 4/6 sys murmur, loudest at LUSB, radiating to carotids  Abd: soft, NTND  Ext: no edema, R in immobilizer    Meds:  morphine  - Injectable IV Push PRN  famotidine Injectable IV Push  sodium chloride 0.9%. IV Continuous  potassium chloride    Tablet ER Oral                        8.4    5.9   )-----------( 266      ( 29 May 2017 07:04 )             26.3       05-29    146<H>  |  109<H>  |  10  ----------------------------<  75  3.4<L>   |  24  |  0.30<L>    Ca    8.3<L>      29 May 2017 07:04  Phos  3.1     05-29  Mg     2.0     05-29    TPro  5.7<L>  /  Alb  2.5<L>  /  TBili  0.2  /  DBili  x   /  AST  10<L>  /  ALT  13  /  AlkPhos  82  05-27    CENTRAL LINE: N  LOPEZ: N  A-LINE: N    GLOBAL ISSUE/BEST PRACTICE:  Analgesia: N  Sedation:N  CAM-ICU: neg  HOB elevation: yes  Stress ulcer prophylaxis:Y  VTE prophylaxis:Y, SCD  Glycemic control:Y  Nutrition:NPO    CODE STATUS: FULL

## 2017-05-30 DIAGNOSIS — K92.2 GASTROINTESTINAL HEMORRHAGE, UNSPECIFIED: ICD-10-CM

## 2017-05-30 LAB
ANION GAP SERPL CALC-SCNC: 11 MMOL/L — SIGNIFICANT CHANGE UP (ref 5–17)
BUN SERPL-MCNC: 13 MG/DL — SIGNIFICANT CHANGE UP (ref 7–23)
CALCIUM SERPL-MCNC: 8.2 MG/DL — LOW (ref 8.5–10.1)
CHLORIDE SERPL-SCNC: 109 MMOL/L — HIGH (ref 96–108)
CO2 SERPL-SCNC: 25 MMOL/L — SIGNIFICANT CHANGE UP (ref 22–31)
CREAT SERPL-MCNC: 0.31 MG/DL — LOW (ref 0.5–1.3)
GLUCOSE SERPL-MCNC: 77 MG/DL — SIGNIFICANT CHANGE UP (ref 70–99)
HCT VFR BLD CALC: 26.2 % — LOW (ref 34.5–45)
HGB BLD-MCNC: 8.3 G/DL — LOW (ref 11.5–15.5)
MAGNESIUM SERPL-MCNC: 2.1 MG/DL — SIGNIFICANT CHANGE UP (ref 1.6–2.6)
MCHC RBC-ENTMCNC: 26.9 PG — LOW (ref 27–34)
MCHC RBC-ENTMCNC: 31.5 GM/DL — LOW (ref 32–36)
MCV RBC AUTO: 85.5 FL — SIGNIFICANT CHANGE UP (ref 80–100)
PHOSPHATE SERPL-MCNC: 2.8 MG/DL — SIGNIFICANT CHANGE UP (ref 2.5–4.5)
PLATELET # BLD AUTO: 287 K/UL — SIGNIFICANT CHANGE UP (ref 150–400)
POTASSIUM SERPL-MCNC: 3.4 MMOL/L — LOW (ref 3.5–5.3)
POTASSIUM SERPL-SCNC: 3.4 MMOL/L — LOW (ref 3.5–5.3)
RBC # BLD: 3.06 M/UL — LOW (ref 3.8–5.2)
RBC # FLD: 14.2 % — SIGNIFICANT CHANGE UP (ref 10.3–14.5)
SODIUM SERPL-SCNC: 145 MMOL/L — SIGNIFICANT CHANGE UP (ref 135–145)
WBC # BLD: 7.2 K/UL — SIGNIFICANT CHANGE UP (ref 3.8–10.5)
WBC # FLD AUTO: 7.2 K/UL — SIGNIFICANT CHANGE UP (ref 3.8–10.5)

## 2017-05-30 PROCEDURE — 99233 SBSQ HOSP IP/OBS HIGH 50: CPT | Mod: GC

## 2017-05-30 PROCEDURE — 99291 CRITICAL CARE FIRST HOUR: CPT

## 2017-05-30 RX ORDER — SODIUM CHLORIDE 9 MG/ML
1000 INJECTION, SOLUTION INTRAVENOUS
Qty: 0 | Refills: 0 | Status: DISCONTINUED | OUTPATIENT
Start: 2017-05-30 | End: 2017-05-31

## 2017-05-30 RX ORDER — POTASSIUM CHLORIDE 20 MEQ
40 PACKET (EA) ORAL ONCE
Qty: 0 | Refills: 0 | Status: COMPLETED | OUTPATIENT
Start: 2017-05-30 | End: 2017-05-30

## 2017-05-30 RX ORDER — MESALAMINE 400 MG
1000 TABLET, DELAYED RELEASE (ENTERIC COATED) ORAL
Qty: 0 | Refills: 0 | Status: DISCONTINUED | OUTPATIENT
Start: 2017-05-30 | End: 2017-06-01

## 2017-05-30 RX ADMIN — Medication 40 MILLIEQUIVALENT(S): at 08:40

## 2017-05-30 RX ADMIN — Medication 1000 MILLIGRAM(S): at 18:12

## 2017-05-30 RX ADMIN — MORPHINE SULFATE 1 MILLIGRAM(S): 50 CAPSULE, EXTENDED RELEASE ORAL at 16:43

## 2017-05-30 RX ADMIN — MORPHINE SULFATE 1 MILLIGRAM(S): 50 CAPSULE, EXTENDED RELEASE ORAL at 22:26

## 2017-05-30 RX ADMIN — MORPHINE SULFATE 1 MILLIGRAM(S): 50 CAPSULE, EXTENDED RELEASE ORAL at 02:31

## 2017-05-30 RX ADMIN — MORPHINE SULFATE 1 MILLIGRAM(S): 50 CAPSULE, EXTENDED RELEASE ORAL at 08:55

## 2017-05-30 RX ADMIN — MORPHINE SULFATE 1 MILLIGRAM(S): 50 CAPSULE, EXTENDED RELEASE ORAL at 02:01

## 2017-05-30 RX ADMIN — FAMOTIDINE 20 MILLIGRAM(S): 10 INJECTION INTRAVENOUS at 10:30

## 2017-05-30 RX ADMIN — MORPHINE SULFATE 1 MILLIGRAM(S): 50 CAPSULE, EXTENDED RELEASE ORAL at 13:12

## 2017-05-30 RX ADMIN — MORPHINE SULFATE 1 MILLIGRAM(S): 50 CAPSULE, EXTENDED RELEASE ORAL at 12:56

## 2017-05-30 RX ADMIN — MORPHINE SULFATE 1 MILLIGRAM(S): 50 CAPSULE, EXTENDED RELEASE ORAL at 22:52

## 2017-05-30 RX ADMIN — MORPHINE SULFATE 1 MILLIGRAM(S): 50 CAPSULE, EXTENDED RELEASE ORAL at 17:00

## 2017-05-30 RX ADMIN — FAMOTIDINE 20 MILLIGRAM(S): 10 INJECTION INTRAVENOUS at 22:26

## 2017-05-30 RX ADMIN — MORPHINE SULFATE 1 MILLIGRAM(S): 50 CAPSULE, EXTENDED RELEASE ORAL at 22:56

## 2017-05-30 RX ADMIN — MORPHINE SULFATE 1 MILLIGRAM(S): 50 CAPSULE, EXTENDED RELEASE ORAL at 08:41
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
## 2017-05-30 NOTE — PROGRESS NOTE ADULT - SUBJECTIVE AND OBJECTIVE BOX
Interval events: afebrile overnight.  Tolerating only sips of broth.  2 small tarry stools this AM.      Review of Systems:  Constitutional: no fever, chills, fatigue  Neuro: no headache, numbness, weakness  Resp: no cough, wheezing, shortness of breath  CVS: no chest pain, palpitations, leg swelling  GI: no abdominal pain, nausea, vomiting, diarrhea   : no dysuria, frequency, incontinence  Skin: no itching, burning, rashes, or lesions   Msk: no joint pain or swelling  Psych: no depression, anxiety    T(F): 97.8, Max: 98.5 (05-29 @ 20:15)  HR: 75 (67 - 81)  BP: 106/50 (92/50 - 114/51)  RR: 17 (10 - 30)  SpO2: 95% (93% - 100%)  Wt(kg): --        CAPILLARY BLOOD GLUCOSE    I&O's Summary  I & Os for 24h ending 30 May 2017 07:00  =============================================  IN: 1860 ml / OUT: 1600 ml / NET: 260 ml    I & Os for current day (as of 30 May 2017 15:00)  =============================================  IN: 345 ml / OUT: 800 ml / NET: -455 ml      Physical Exam:     Gen: NAD  Neuro: AAO x 3  HEENT: NCAT  CV: +systolic murmur. RRR, +S1, +S2  Pulm: Lungs CTA B/L, no wheezes, rales, rhonchi  GI: +BS x 4 QUADS, soft, tender to palpation LLQ. NO guarding.   Ext: R lower leg brace.  Compression stockings.       Meds:  morphine  - Injectable 1milliGRAM(s) IV Push every 1 hour PRN  famotidine Injectable 20milliGRAM(s) IV Push every 12 hours  aluminum hydroxide/magnesium hydroxide/simethicone Suspension 30milliLiter(s) Oral every 6 hours PRN  lactated ringers. 1000milliLiter(s) IV Continuous <Continuous>                          8.3    7.2   )-----------( 287      ( 30 May 2017 05:46 )             26.2       05-30    145  |  109<H>  |  13  ----------------------------<  77  3.4<L>   |  25  |  0.31<L>    Ca    8.2<L>      30 May 2017 05:46  Phos  2.8     05-30  Mg     2.1     05-30    CENTRAL LINE: N   DATE INSERTED:   REMOVE: Y/N    LOPEZ: N      DATE INSERTED:        REMOVE: Y/N    A-LINE: N     DATE INSERTED:              REMOVE: Y/N    GLOBAL ISSUE/BEST PRACTICE:  Analgesia: Morphine  Sedation: None  HOB elevation: yes  Stress ulcer prophylaxis: Famotidine  VTE prophylaxis: SCDs  Glycemic control: None  Nutrition: Clear liquid    CODE STATUS: *** Interval events: afebrile overnight.  Tolerating only sips of broth.  2 small tarry stools this AM.      Review of Systems:  Constitutional: no fever, chills  Neuro: no headache, lightheadedness, dizziness  Resp: no cough, wheezing, shortness of breath  CVS: no chest pain, palpitations, leg swelling  GI: abdominal pain (relieved by Morphine).  No nausea, vomiting, diarrhea   : no dysuria, frequency, incontinence  Msk: ongoing joint pain right shoulder      T(F): 97.8, Max: 98.5 (05-29 @ 20:15)  HR: 75 (67 - 81)  BP: 106/50 (92/50 - 114/51)  RR: 17 (10 - 30)  SpO2: 95% (93% - 100%)  Wt(kg): --        CAPILLARY BLOOD GLUCOSE    I&O's Summary  I & Os for 24h ending 30 May 2017 07:00  =============================================  IN: 1860 ml / OUT: 1600 ml / NET: 260 ml    I & Os for current day (as of 30 May 2017 15:00)  =============================================  IN: 345 ml / OUT: 800 ml / NET: -455 ml      Physical Exam:     Gen: NAD  Neuro: AAO x 3  HEENT: NCAT  CV: +systolic murmur. RRR, +S1, +S2  Pulm: Lungs CTA B/L, no wheezes, rales, rhonchi  GI: +BS x 4 QUADS, soft, tender to palpation LLQ. NO guarding.   Ext: R lower leg brace.  Compression stockings.       Meds:  morphine  - Injectable 1milliGRAM(s) IV Push every 1 hour PRN  famotidine Injectable 20milliGRAM(s) IV Push every 12 hours  aluminum hydroxide/magnesium hydroxide/simethicone Suspension 30milliLiter(s) Oral every 6 hours PRN  lactated ringers. 1000milliLiter(s) IV Continuous <Continuous>                          8.3    7.2   )-----------( 287      ( 30 May 2017 05:46 )             26.2       05-30    145  |  109<H>  |  13  ----------------------------<  75  3.4<L>   |  25  |  0.31<L>    Ca    8.2<L>      30 May 2017 05:46  Phos  2.8     05-30  Mg     2.1     05-30    CENTRAL LINE: N   DATE INSERTED:   REMOVE: Y/N    LOPEZ: N      DATE INSERTED:        REMOVE: Y/N    A-LINE: N     DATE INSERTED:              REMOVE: Y/N    GLOBAL ISSUE/BEST PRACTICE:  Analgesia: Morphine  Sedation: None  HOB elevation: yes  Stress ulcer prophylaxis: Famotidine  VTE prophylaxis: SCDs  Glycemic control: None  Nutrition: Clear liquid    CODE STATUS: *** Interval events: afebrile overnight.  Tolerating only sips of broth.  2 small tarry stools this AM.      Review of Systems:  Constitutional: no fever, chills  Neuro: no headache, lightheadedness, dizziness  Resp: no cough, wheezing, shortness of breath  CVS: no chest pain, palpitations, leg swelling  GI: abdominal pain (relieved by Morphine).  No nausea, vomiting, diarrhea   : no dysuria, frequency, incontinence  Msk: ongoing joint pain right shoulder      T(F): 97.8, Max: 98.5 (05-29 @ 20:15)  HR: 75 (67 - 81)  BP: 106/50 (92/50 - 114/51)  RR: 17 (10 - 30)  SpO2: 95% (93% - 100%)  Wt(kg): --        CAPILLARY BLOOD GLUCOSE    I&O's Summary  I & Os for 24h ending 30 May 2017 07:00  =============================================  IN: 1860 ml / OUT: 1600 ml / NET: 260 ml    I & Os for current day (as of 30 May 2017 15:00)  =============================================  IN: 345 ml / OUT: 800 ml / NET: -455 ml      Physical Exam:     Gen: NAD  Neuro: AAO x 3  HEENT: NCAT  CV: +systolic murmur. RRR, +S1, +S2  Pulm: Lungs CTA B/L, no wheezes, rales, rhonchi  GI: +BS x 4 QUADS, soft, tender to palpation LLQ. NO guarding.   Ext: R lower leg brace.  Compression stockings  Skin: WWP      Meds:  morphine  - Injectable 1milliGRAM(s) IV Push every 1 hour PRN  famotidine Injectable 20milliGRAM(s) IV Push every 12 hours  aluminum hydroxide/magnesium hydroxide/simethicone Suspension 30milliLiter(s) Oral every 6 hours PRN  lactated ringers. 1000milliLiter(s) IV Continuous <Continuous>                          8.3    7.2   )-----------( 287      ( 30 May 2017 05:46 )             26.2       05-30    145  |  109<H>  |  13  ----------------------------<  77  3.4<L>   |  25  |  0.31<L>    Ca    8.2<L>      30 May 2017 05:46  Phos  2.8     05-30  Mg     2.1     05-30    CENTRAL LINE: N      LOPEZ: N          A-LINE: N         GLOBAL ISSUE/BEST PRACTICE:  Analgesia: Morphine  Sedation: None  HOB elevation: yes  Stress ulcer prophylaxis: Famotidine  VTE prophylaxis: SCDs  Glycemic control: None  Nutrition: Clear liquid    CODE STATUS: full

## 2017-05-30 NOTE — PROGRESS NOTE ADULT - SUBJECTIVE AND OBJECTIVE BOX
INTERVAL HPI/OVERNIGHT EVENTS:  HPI:  no bleeding still with some pain, is npo and now on clears also      MEDICATIONS  (STANDING):  famotidine Injectable 20milliGRAM(s) IV Push every 12 hours  lactated ringers. 1000milliLiter(s) IV Continuous <Continuous>    MEDICATIONS  (PRN):  morphine  - Injectable 1milliGRAM(s) IV Push every 1 hour PRN Moderate Pain (4 - 6)  aluminum hydroxide/magnesium hydroxide/simethicone Suspension 30milliLiter(s) Oral every 6 hours PRN Dyspepsia      Allergies    penicillin (Unknown)  Plavix (Unknown)  sulfa drugs (Unknown)    Intolerances          General:  No wt loss, fevers, chills, night sweats, fatigue,   Eyes:  Good vision, no reported pain  ENT:  No sore throat, pain, runny nose, dysphagia  CV:  No pain, palpitations, hypo/hypertension  Resp:  No dyspnea, cough, tachypnea, wheezing  GI:  No pain, No nausea, No vomiting, No diarrhea, No constipation, No weight loss, No fever, No pruritis, No rectal bleeding, No tarry stools, No dysphagia,  :  No pain, bleeding, incontinence, nocturia  Muscle:  No pain, weakness  Neuro:  No weakness, tingling, memory problems  Psych:  No fatigue, insomnia, mood problems, depression  Endocrine:  No polyuria, polydipsia, cold/heat intolerance  Heme:  No petechiae, ecchymosis, easy bruisability  Skin:  No rash, tattoos, scars, edema      PHYSICAL EXAM:   Vital Signs:  Vital Signs Last 24 Hrs  T(C): 36.7, Max: 36.9 (05-29 @ 20:15)  T(F): 98, Max: 98.5 (05-29 @ 20:15)  HR: 75 (67 - 81)  BP: 106/50 (92/50 - 114/51)  BP(mean): 71 (64 - 94)  RR: 17 (10 - 30)  SpO2: 95% (93% - 100%)  Daily     Daily I&O's Summary  I & Os for 24h ending 30 May 2017 07:00  =============================================  IN: 1860 ml / OUT: 1600 ml / NET: 260 ml    I & Os for current day (as of 30 May 2017 15:59)  =============================================  IN: 345 ml / OUT: 800 ml / NET: -455 ml      GENERAL:  Appears stated age, well-groomed, well-nourished, no distress  HEENT:  NC/AT,  conjunctivae clear and pink, no thyromegaly, nodules, adenopathy, no JVD, sclera -anicteric  CHEST:  Full & symmetric excursion, no increased effort, breath sounds clear  HEART:  Regular rhythm, S1, S2, no murmur/rub/S3/S4, no abdominal bruit, no edema  ABDOMEN:  Soft, non-tender, non-distended, normoactive bowel sounds,  no masses ,no hepato-splenomegaly, no signs of chronic liver disease  EXTEREMITIES:  no cyanosis,clubbing or edema  SKIN:  No rash/erythema/ecchymoses/petechiae/wounds/abscess/warm/dry  NEURO:  Alert, oriented, no asterixis, no tremor, no encephalopathy      LABS:                        8.3    7.2   )-----------( 287      ( 30 May 2017 05:46 )             26.2     05-30    145  |  109<H>  |  13  ----------------------------<  77  3.4<L>   |  25  |  0.31<L>    Ca    8.2<L>      30 May 2017 05:46  Phos  2.8     05-30  Mg     2.1     05-30          amylase   lipase  RADIOLOGY & ADDITIONAL TESTS:
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
## 2017-05-31 DIAGNOSIS — D64.89 OTHER SPECIFIED ANEMIAS: ICD-10-CM

## 2017-05-31 DIAGNOSIS — R58 HEMORRHAGE, NOT ELSEWHERE CLASSIFIED: ICD-10-CM

## 2017-05-31 LAB
ANION GAP SERPL CALC-SCNC: 11 MMOL/L — SIGNIFICANT CHANGE UP (ref 5–17)
ANION GAP SERPL CALC-SCNC: 11 MMOL/L — SIGNIFICANT CHANGE UP (ref 5–17)
BUN SERPL-MCNC: 10 MG/DL — SIGNIFICANT CHANGE UP (ref 7–23)
BUN SERPL-MCNC: 8 MG/DL — SIGNIFICANT CHANGE UP (ref 7–23)
CALCIUM SERPL-MCNC: 9 MG/DL — SIGNIFICANT CHANGE UP (ref 8.5–10.1)
CALCIUM SERPL-MCNC: 9.1 MG/DL — SIGNIFICANT CHANGE UP (ref 8.5–10.1)
CHLORIDE SERPL-SCNC: 104 MMOL/L — SIGNIFICANT CHANGE UP (ref 96–108)
CHLORIDE SERPL-SCNC: 106 MMOL/L — SIGNIFICANT CHANGE UP (ref 96–108)
CO2 SERPL-SCNC: 27 MMOL/L — SIGNIFICANT CHANGE UP (ref 22–31)
CO2 SERPL-SCNC: 28 MMOL/L — SIGNIFICANT CHANGE UP (ref 22–31)
CREAT SERPL-MCNC: 0.36 MG/DL — LOW (ref 0.5–1.3)
CREAT SERPL-MCNC: 0.41 MG/DL — LOW (ref 0.5–1.3)
GLUCOSE SERPL-MCNC: 83 MG/DL — SIGNIFICANT CHANGE UP (ref 70–99)
GLUCOSE SERPL-MCNC: 88 MG/DL — SIGNIFICANT CHANGE UP (ref 70–99)
HCT VFR BLD CALC: 33.1 % — LOW (ref 34.5–45)
HGB BLD-MCNC: 10.6 G/DL — LOW (ref 11.5–15.5)
MAGNESIUM SERPL-MCNC: 2.1 MG/DL — SIGNIFICANT CHANGE UP (ref 1.6–2.6)
MAGNESIUM SERPL-MCNC: 2.2 MG/DL — SIGNIFICANT CHANGE UP (ref 1.6–2.6)
MCHC RBC-ENTMCNC: 27.5 PG — SIGNIFICANT CHANGE UP (ref 27–34)
MCHC RBC-ENTMCNC: 32.1 GM/DL — SIGNIFICANT CHANGE UP (ref 32–36)
MCV RBC AUTO: 85.7 FL — SIGNIFICANT CHANGE UP (ref 80–100)
PHOSPHATE SERPL-MCNC: 3.4 MG/DL — SIGNIFICANT CHANGE UP (ref 2.5–4.5)
PHOSPHATE SERPL-MCNC: 3.7 MG/DL — SIGNIFICANT CHANGE UP (ref 2.5–4.5)
PLATELET # BLD AUTO: 362 K/UL — SIGNIFICANT CHANGE UP (ref 150–400)
POTASSIUM SERPL-MCNC: 3.3 MMOL/L — LOW (ref 3.5–5.3)
POTASSIUM SERPL-MCNC: 3.3 MMOL/L — LOW (ref 3.5–5.3)
POTASSIUM SERPL-SCNC: 3.3 MMOL/L — LOW (ref 3.5–5.3)
POTASSIUM SERPL-SCNC: 3.3 MMOL/L — LOW (ref 3.5–5.3)
RBC # BLD: 3.86 M/UL — SIGNIFICANT CHANGE UP (ref 3.8–5.2)
RBC # FLD: 15 % — HIGH (ref 10.3–14.5)
SODIUM SERPL-SCNC: 143 MMOL/L — SIGNIFICANT CHANGE UP (ref 135–145)
SODIUM SERPL-SCNC: 144 MMOL/L — SIGNIFICANT CHANGE UP (ref 135–145)
T3 SERPL-MCNC: 97 NG/DL — SIGNIFICANT CHANGE UP (ref 80–200)
T4 AB SER-ACNC: 7.7 UG/DL — SIGNIFICANT CHANGE UP (ref 4.6–12)
TSH SERPL-MCNC: 4.79 UIU/ML — HIGH (ref 0.36–3.74)
WBC # BLD: 13.4 K/UL — HIGH (ref 3.8–10.5)
WBC # FLD AUTO: 13.4 K/UL — HIGH (ref 3.8–10.5)

## 2017-05-31 PROCEDURE — 99291 CRITICAL CARE FIRST HOUR: CPT

## 2017-05-31 PROCEDURE — 99233 SBSQ HOSP IP/OBS HIGH 50: CPT | Mod: GC

## 2017-05-31 RX ORDER — POTASSIUM CHLORIDE 20 MEQ
40 PACKET (EA) ORAL ONCE
Qty: 0 | Refills: 0 | Status: DISCONTINUED | OUTPATIENT
Start: 2017-05-31 | End: 2017-05-31

## 2017-05-31 RX ORDER — POTASSIUM CHLORIDE 20 MEQ
40 PACKET (EA) ORAL ONCE
Qty: 0 | Refills: 0 | Status: COMPLETED | OUTPATIENT
Start: 2017-05-31 | End: 2017-05-31

## 2017-05-31 RX ORDER — SODIUM CHLORIDE 9 MG/ML
1000 INJECTION, SOLUTION INTRAVENOUS
Qty: 0 | Refills: 0 | Status: DISCONTINUED | OUTPATIENT
Start: 2017-05-31 | End: 2017-05-31

## 2017-05-31 RX ORDER — SODIUM CHLORIDE 9 MG/ML
1000 INJECTION, SOLUTION INTRAVENOUS
Qty: 0 | Refills: 0 | Status: DISCONTINUED | OUTPATIENT
Start: 2017-05-31 | End: 2017-06-01

## 2017-05-31 RX ORDER — POTASSIUM CHLORIDE 20 MEQ
10 PACKET (EA) ORAL
Qty: 0 | Refills: 0 | Status: DISCONTINUED | OUTPATIENT
Start: 2017-05-31 | End: 2017-05-31

## 2017-05-31 RX ADMIN — MORPHINE SULFATE 1 MILLIGRAM(S): 50 CAPSULE, EXTENDED RELEASE ORAL at 19:13

## 2017-05-31 RX ADMIN — FAMOTIDINE 20 MILLIGRAM(S): 10 INJECTION INTRAVENOUS at 21:14

## 2017-05-31 RX ADMIN — MORPHINE SULFATE 1 MILLIGRAM(S): 50 CAPSULE, EXTENDED RELEASE ORAL at 06:00

## 2017-05-31 RX ADMIN — FAMOTIDINE 20 MILLIGRAM(S): 10 INJECTION INTRAVENOUS at 12:14

## 2017-05-31 RX ADMIN — Medication 40 MILLIEQUIVALENT(S): at 09:40

## 2017-05-31 RX ADMIN — SODIUM CHLORIDE 50 MILLILITER(S): 9 INJECTION, SOLUTION INTRAVENOUS at 21:13

## 2017-05-31 RX ADMIN — MORPHINE SULFATE 1 MILLIGRAM(S): 50 CAPSULE, EXTENDED RELEASE ORAL at 06:30

## 2017-05-31 RX ADMIN — Medication 40 MILLIEQUIVALENT(S): at 06:22

## 2017-05-31 RX ADMIN — MORPHINE SULFATE 1 MILLIGRAM(S): 50 CAPSULE, EXTENDED RELEASE ORAL at 18:36

## 2017-05-31 RX ADMIN — MORPHINE SULFATE 1 MILLIGRAM(S): 50 CAPSULE, EXTENDED RELEASE ORAL at 02:22

## 2017-05-31 RX ADMIN — SODIUM CHLORIDE 100 MILLILITER(S): 9 INJECTION, SOLUTION INTRAVENOUS at 06:22

## 2017-05-31 RX ADMIN — MORPHINE SULFATE 1 MILLIGRAM(S): 50 CAPSULE, EXTENDED RELEASE ORAL at 22:45

## 2017-05-31 RX ADMIN — MORPHINE SULFATE 1 MILLIGRAM(S): 50 CAPSULE, EXTENDED RELEASE ORAL at 12:15

## 2017-05-31 RX ADMIN — SODIUM CHLORIDE 50 MILLILITER(S): 9 INJECTION, SOLUTION INTRAVENOUS at 14:12

## 2017-05-31 RX ADMIN — MORPHINE SULFATE 1 MILLIGRAM(S): 50 CAPSULE, EXTENDED RELEASE ORAL at 22:29

## 2017-05-31 RX ADMIN — MORPHINE SULFATE 1 MILLIGRAM(S): 50 CAPSULE, EXTENDED RELEASE ORAL at 12:45

## 2017-05-31 NOTE — PROGRESS NOTE ADULT - ASSESSMENT
76 year old woman with known PAF, no AC secondary to multiple GI bleeds in the past, multiple endoscopic procedures, severe aortic stenosis, severe pulmonary hypertension, RSD who presents to the ED with BRBPR.    - Continue ICU care  - Monitor serial H/H and transfuse to H/H greater than 7. Currently stable.   - Hold antiplatelets and anticoagulants for now.   - F/U GI  - PSVT likely secondary to hypokalemia. Monitor and replete electrolytes. Keep K>4.0 and Mg>2.0. Defer AVN agent for now given lowish BP.   - If she agrees to an endoscopic procedure, she is high risk for perioperative cardiac events secondary to severe AS and sever pulmonary hypertension, but currently optimized as best as possible from CV POV.  NO evidence of acute ischemia, CHF, or uncontrolled HTN  - Supplemental oxygen as needed  - Further cardiac workup will depend on clinical course.   - All other workup per primary team. Will followup.   - Patient at high risk for decompensation. Critically ill. >35 minutes of critical care time was spent with this patient.

## 2017-05-31 NOTE — CONSULT NOTE ADULT - PROBLEM SELECTOR RECOMMENDATION 9
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

## 2017-05-31 NOTE — PROGRESS NOTE ADULT - SUBJECTIVE AND OBJECTIVE BOX
[facility]    Arlin Moody, Juliane Merino, Liborio Daly      [phone]    CHIEF COMPLAINT: Patient is a 76y old  Female who presents with a chief complaint of acute GI bleed with anemia    Follow Up: history of severe aortic stenosis with severe pulmonary hypertension. PAF without anticoagulation. History of recurrent GI bleed. Hx of multiple endo procedures. No complaining of diarrhea.  Transient A. fib overnight that was transient. Now in sinus rhythm. Denies any chest pain or  shortness of breath. Complaint abdominal pain but no nausea or vomiting     Interim history:    MEDICATIONS  (STANDING):  famotidine Injectable 20milliGRAM(s) IV Push every 12 hours  mesalamine ER Capsule 1000milliGRAM(s) Oral two times a day  lactated ringers 1000milliLiter(s) IV Continuous <Continuous>    MEDICATIONS  (PRN):  morphine  - Injectable 1milliGRAM(s) IV Push every 1 hour PRN Moderate Pain (4 - 6)  aluminum hydroxide/magnesium hydroxide/simethicone Suspension 30milliLiter(s) Oral every 6 hours PRN Dyspepsia      REVIEW OF SYSTEMS:  eye, ent, GI, , allergic, dermatologic, musculoskeletal and neurologic are negative except as described above    Vital Signs Last 24 Hrs  T(C): 36.8, Max: 36.8 (05-30 @ 20:00)  T(F): 98.3, Max: 98.3 (05-30 @ 23:45)  HR: 78 (71 - 124)  BP: 107/55 (101/63 - 158/75)  BP(mean): 76 (71 - 108)  RR: 16 (10 - 31)  SpO2: 95% (94% - 100%)    I&O's Summary  I & Os for 24h ending 30 May 2017 07:00  =============================================  IN: 1860 ml / OUT: 1600 ml / NET: 260 ml    I & Os for current day (as of 31 May 2017 06:53)  =============================================  IN: 990 ml / OUT: 2400 ml / NET: -1410 ml      Telemetry past 24h:RSR    PHYSICAL EXAM:    Constitutional:  thin and chronically ill   HEENT:  MMM, sclerae anicteric, conjunctivae clear, no oral cyanosis.  Pulmonary: Non-labored, breath sounds are clear bilaterally, No wheezing, rales or rhonchi  Cardiovascular: Regular, S1 and S2, systolic ejection murmurs, rubs, gallops or clicks  Gastrointestinal: Bowel Sounds present, soft, tender.   Lymph: No peripheral edema. No lymphadenopathy.  Neurological: Alert, no focal deficits  Skin: No rashes.  Psych:  Mood & affect appropriate    LABS: All Labs Reviewed:                        10.6   13.4  )-----------( 362      ( 31 May 2017 06:00 )             33.1                         8.3    7.2   )-----------( 287      ( 30 May 2017 05:46 )             26.2                         8.4    5.9   )-----------( 266      ( 29 May 2017 07:04 )             26.3     31 May 2017 06:00    143    |  104    |  10     ----------------------------<  88     3.3     |  28     |  0.41   31 May 2017 00:47    144    |  106    |  8      ----------------------------<  83     3.3     |  27     |  0.36   30 May 2017 05:46    145    |  109    |  13     ----------------------------<  77     3.4     |  25     |  0.31     Ca    9.1        31 May 2017 06:00  Ca    9.0        31 May 2017 00:47  Ca    8.2        30 May 2017 05:46  Phos  3.7       31 May 2017 06:00  Phos  3.4       31 May 2017 00:47  Phos  2.8       30 May 2017 05:46  Mg     2.2       31 May 2017 06:00  Mg     2.1       31 May 2017 00:47  Mg     2.1       30 May 2017 05:46            Blood Culture:     05-31 @ 00:47  TSH: 4.79      RADIOLOGY:    EKG:
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
## 2017-05-31 NOTE — PROGRESS NOTE ADULT - SUBJECTIVE AND OBJECTIVE BOX
INTERVAL HPI/OVERNIGHT EVENTS:  melena      MEDICATIONS  (STANDING):  famotidine Injectable 20milliGRAM(s) IV Push every 12 hours  mesalamine ER Capsule 1000milliGRAM(s) Oral two times a day  lactated ringers 1000milliLiter(s) IV Continuous <Continuous>    MEDICATIONS  (PRN):  morphine  - Injectable 1milliGRAM(s) IV Push every 1 hour PRN Moderate Pain (4 - 6)  aluminum hydroxide/magnesium hydroxide/simethicone Suspension 30milliLiter(s) Oral every 6 hours PRN Dyspepsia      Allergies    penicillin (Unknown)  Plavix (Unknown)  sulfa drugs (Unknown)    Intolerances        Review of Systems:    General:  No wt loss, fevers, chills, night sweats,fatigue,   Eyes:  Good vision, no reported pain  ENT:  No sore throat, pain, runny nose, dysphagia  CV:  No pain, palpitatioins, hypo/hypertension  Resp:  No dyspnea, cough, tachypnea, wheezing  GI:  No pain, No nausea, No vomiting, No diarrhea, No constipatiion, No weight loss, No fever, No pruritis, No rectal bleeding, No tarry stools, No dysphagia,  :  No pain, bleeding, incontinence, nocturia  Muscle:  No pain, weakness  Neuro:  No weakness, tingling, memory problems  Psych:  No fatigue, insomnia, mood problems, depression  Endocrine:  No polyuria, polydypsia, cold/heat intolerance  Heme:  No petechiae, ecchymosis, easy bruisability  Skin:  No rash, tattoos, scars, edema      Vital Signs Last 24 Hrs  T(C): 36.7, Max: 37 (05-31 @ 04:01)  T(F): 98, Max: 98.6 (05-31 @ 04:01)  HR: 101 (71 - 124)  BP: 111/56 (101/63 - 158/75)  BP(mean): 79 (72 - 108)  RR: 20 (10 - 31)  SpO2: 97% (94% - 100%)    PHYSICAL EXAM:    Constitutional: NAD, well-developed  HEENT: EOMI, throat clear  Neck: No LAD, supple  Respiratory: CTA and P  Cardiovascular: S1 and S2, RRR, no M  Gastrointestinal: BS+, soft, NT/ND, neg HSM,  Extremities: No peripheral edema, neg clubing, cyanosis  Vascular: 2+ peripheral pulses  Neurological: A/O x 3, no focal deficits  Psychiatric: Normal mood, normal affect  Skin: No rashes      LABS:                        10.6   13.4  )-----------( 362      ( 31 May 2017 06:00 )             33.1     05-31    143  |  104  |  10  ----------------------------<  88  3.3<L>   |  28  |  0.41<L>    Ca    9.1      31 May 2017 06:00  Phos  3.7     05-31  Mg     2.2     05-31            RADIOLOGY & ADDITIONAL TESTS:

## 2017-05-31 NOTE — PROGRESS NOTE ADULT - SUBJECTIVE AND OBJECTIVE BOX
Interval events: Episode of afib this AM, tachycardic to 139. Potassium repleted. 3 brownish green stools overnight per RN.  Patient reports she felt palpitations overnight, feels she cannot eat/drink anything.  Feels she can't get off bedpan. Has been using morphine q4h.    Review of Systems:  Constitutional: no fever, chills  Neuro: no headache,   Resp: no cough, wheezing, shortness of breath  CVS: no chest pain, palpitations currently.  GI: + abdominal pain, episodes of loose stools, nausea. No vomiting.     Msk: right leg pain-ongoing.      T(F): 98, Max: 98.6 (05-31 @ 04:01)  HR: 91 (71 - 124)  BP: 112/55 (101/63 - 158/75)  RR: 13 (10 - 31)  SpO2: 97% (94% - 100%)  Wt(kg): --        CAPILLARY BLOOD GLUCOSE    I&O's Summary    I & Os for current day (as of 31 May 2017 08:45)  =============================================  IN: 1690 ml / OUT: 2400 ml / NET: -710 ml      Physical Exam:     Gen: NAD, upset  Neuro: Awake, alert  HEENT: NCAT  CV: +systolic murmur auscultated 2nd intercostal space. RRR, +S1, +S2   Pulm: Lungs CTA B/L no w/r/r  GI: +BS x 4 Quads, soft, tender to palpation LUQ.  Ext: Right leg brace/boot to thigh.  VIKA stocking L leg.  No calf tenderness L leg.    Meds:  morphine  - Injectable 1milliGRAM(s) IV Push every 1 hour PRN  famotidine Injectable 20milliGRAM(s) IV Push every 12 hours  aluminum hydroxide/magnesium hydroxide/simethicone Suspension 30milliLiter(s) Oral every 6 hours PRN  mesalamine ER Capsule 1000milliGRAM(s) Oral two times a day  lactated ringers 1000milliLiter(s) IV Continuous <Continuous>  potassium chloride    Tablet ER 40milliEquivalent(s) Oral once                            10.6   13.4  )-----------( 362      ( 31 May 2017 06:00 )             33.1       05-31    143  |  104  |  10  ----------------------------<  88  3.3<L>   |  28  |  0.41<L>    Ca    9.1      31 May 2017 06:00  Phos  3.7     05-31  Mg     2.2     05-31    CENTRAL LINE: N   DATE INSERTED:   REMOVE: Y/N    LOPEZ: N      DATE INSERTED:        REMOVE: Y/N    A-LINE: N     DATE INSERTED:              REMOVE: Y/N    GLOBAL ISSUE/BEST PRACTICE:  Analgesia: Morphine prn  Sedation: None  HOB elevation: yes  Stress ulcer prophylaxis: Famotidine  VTE prophylaxis: SCDs  Glycemic control: None  Nutrition: Clear Liquid    CODE STATUS: *** Interval events: Episode of afib overnight, tachycardic to 130s. Potassium repleted. 3 brownish green stools overnight per RN.  Patient reports she felt palpitations overnight, feels she cannot eat/drink anything.  Feels she can't get off bedpan. Has been using morphine q4h.    Review of Systems:  Constitutional: no fever, chills  Neuro: no headache, lightheadedness, dizziness  Resp: no cough, wheezing, shortness of breath  CVS: no chest pain, palpitations currently.  GI: + abdominal pain, episodes of loose stools, nausea. No vomiting.   Msk: right leg pain-ongoing.      T(F): 98, Max: 98.6 (05-31 @ 04:01)  HR: 91 (71 - 124)  BP: 112/55 (101/63 - 158/75)  RR: 13 (10 - 31)  SpO2: 97% (94% - 100%)  Wt(kg): --      CAPILLARY BLOOD GLUCOSE    I&O's Summary    I & Os for current day (as of 31 May 2017 08:45)  =============================================  IN: 1690 ml / OUT: 2400 ml / NET: -710 ml      Physical Exam:     Gen: NAD, upset  Neuro: Awake, alert  HEENT: NCAT  CV: +systolic murmur auscultated 2nd intercostal space. RRR, +S1, +S2   Pulm: Lungs CTA B/L no w/r/r  GI: +BS x 4 Quads, soft, tender to palpation LUQ.  Ext: Right leg brace/boot to thigh.  VIKA stocking L leg.  No calf tenderness L leg.    Meds:  morphine  - Injectable 1milliGRAM(s) IV Push every 1 hour PRN  famotidine Injectable 20milliGRAM(s) IV Push every 12 hours  aluminum hydroxide/magnesium hydroxide/simethicone Suspension 30milliLiter(s) Oral every 6 hours PRN  mesalamine ER Capsule 1000milliGRAM(s) Oral two times a day  lactated ringers 1000milliLiter(s) IV Continuous <Continuous>  potassium chloride    Tablet ER 40milliEquivalent(s) Oral once                            10.6   13.4  )-----------( 362      ( 31 May 2017 06:00 )             33.1       05-31    143  |  104  |  10  ----------------------------<  88  3.3<L>   |  28  |  0.41<L>    Ca    9.1      31 May 2017 06:00  Phos  3.7     05-31  Mg     2.2     05-31    CENTRAL LINE: N   DATE INSERTED:   REMOVE: Y/N    LOPEZ: N      DATE INSERTED:        REMOVE: Y/N    A-LINE: N     DATE INSERTED:              REMOVE: Y/N    GLOBAL ISSUE/BEST PRACTICE:  Analgesia: Morphine prn  Sedation: None  HOB elevation: yes  Stress ulcer prophylaxis: Famotidine  VTE prophylaxis: SCDs  Glycemic control: None  Nutrition: Clear Liquid    CODE STATUS: *** Interval events: Episode of afib overnight, tachycardic to 130s. Potassium repleted. 3 brownish green stools overnight per RN.  Patient reports she felt palpitations overnight, feels she cannot eat/drink anything.  Feels she can't get off bedpan. Has been using morphine q4h.    Review of Systems:  Constitutional: no fever, chills  Neuro: no headache, lightheadedness, dizziness  Resp: no cough, wheezing, shortness of breath  CVS: no chest pain, palpitations currently.  GI: + abdominal pain, episodes of loose stools, nausea. No vomiting.   Msk: right leg pain-ongoing.      T(F): 98, Max: 98.6 (05-31 @ 04:01)  HR: 91 (71 - 124)  BP: 112/55 (101/63 - 158/75)  RR: 13 (10 - 31)  SpO2: 97% (94% - 100%)  Wt(kg): --      CAPILLARY BLOOD GLUCOSE    I&O's Summary    I & Os for current day (as of 31 May 2017 08:45)  =============================================  IN: 1690 ml / OUT: 2400 ml / NET: -710 ml      Physical Exam:     Gen: NAD, upset  Neuro: Awake, alert  HEENT: NCAT  CV: +systolic murmur auscultated 2nd intercostal space. RRR, +S1, +S2   Pulm: Lungs CTA B/L no w/r/r  GI: +BS x 4 Quads, soft, tender to palpation LUQ.  Ext: Right leg brace/boot to thigh.  VIKA stocking L leg.  No calf tenderness L leg.    Meds:  morphine  - Injectable 1milliGRAM(s) IV Push every 1 hour PRN  famotidine Injectable 20milliGRAM(s) IV Push every 12 hours  aluminum hydroxide/magnesium hydroxide/simethicone Suspension 30milliLiter(s) Oral every 6 hours PRN  mesalamine ER Capsule 1000milliGRAM(s) Oral two times a day  lactated ringers 1000milliLiter(s) IV Continuous <Continuous>  potassium chloride    Tablet ER 40milliEquivalent(s) Oral once                            10.6   13.4  )-----------( 362      ( 31 May 2017 06:00 )             33.1       05-31    143  |  104  |  10  ----------------------------<  88  3.3<L>   |  28  |  0.41<L>    Ca    9.1      31 May 2017 06:00  Phos  3.7     05-31  Mg     2.2     05-31    CENTRAL LINE: N       LOPEZ: N          A-LINE: N    GLOBAL ISSUE/BEST PRACTICE:  Analgesia: Morphine prn  Sedation: None  HOB elevation: yes  Stress ulcer prophylaxis: Famotidine  VTE prophylaxis: SCDs  Glycemic control: None  Nutrition: Clear Liquid    CODE STATUS: full

## 2017-05-31 NOTE — CONSULT NOTE ADULT - ASSESSMENT
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
PROBLEM ASSESSMENT PLAN  RESP   Pulse oximetry acceptable  Keep HOB elevated 30  Pulse oximetry monitoring Target to keep pulse ox in 90-95% range   RO PNEUMONIA SEPSIS  5/27 7p W 17.4   5/27 7p-5/27 9p LA 2.4-.9   5/27 UA Tr L estr Neg nitr Mod bact 6-10 W   SYNCOPE   Cardiac Neuro monitoring   C enzymes   RECTAL BLEED   5/27 7p-5/27 9p Hb 9-8.4   Monitor Hb PPI Transfuse to keep Hb above 7   HEMODYNAMICS   Stable at present Monitor BP UO   MALNUTRITION   5/27 Alb 2.5   Pulmonary status is stable Will follow as requested   TIME SPENT Over 55 minutes aggregate time spent on encounter; activities included   direct patient care, counseling and/or coordinating care reviewing notes, lab data/ imaging , discussion with multidisciplinary team/ patient  /family. Risks, benefits, alternatives  discussed in detail. Questions/concerns  were addressed  to the best of my ability .
75 y/o woman with PMH significant for blood loss anemia ( AVMs), AS and anemia presented with BRBPR.

## 2017-05-31 NOTE — CONSULT NOTE ADULT - NSHPATTENDINGPLANDISCUSS_GEN_ALL_CORE
ICU team , Dr. Puneet Grullon, Dr. Cisneros, patient , ICU team , Dr. Puneet Grullon, Dr. Cisneros, patient
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
## 2017-06-01 ENCOUNTER — TRANSCRIPTION ENCOUNTER (OUTPATIENT)
Age: 77
End: 2017-06-01

## 2017-06-01 VITALS
OXYGEN SATURATION: 90 % | SYSTOLIC BLOOD PRESSURE: 115 MMHG | DIASTOLIC BLOOD PRESSURE: 60 MMHG | RESPIRATION RATE: 16 BRPM | HEART RATE: 76 BPM

## 2017-06-01 LAB
ANION GAP SERPL CALC-SCNC: 7 MMOL/L — SIGNIFICANT CHANGE UP (ref 5–17)
BAKER'S YEAST IGA QN IA: 7.1 UNITS — SIGNIFICANT CHANGE UP
BAKER'S YEAST IGA QN IA: NEGATIVE — SIGNIFICANT CHANGE UP
BAKER'S YEAST IGG QN IA: 14.7 UNITS — SIGNIFICANT CHANGE UP
BAKER'S YEAST IGG QN IA: NEGATIVE — SIGNIFICANT CHANGE UP
BUN SERPL-MCNC: 11 MG/DL — SIGNIFICANT CHANGE UP (ref 7–23)
CALCIUM SERPL-MCNC: 8.7 MG/DL — SIGNIFICANT CHANGE UP (ref 8.5–10.1)
CHLORIDE SERPL-SCNC: 107 MMOL/L — SIGNIFICANT CHANGE UP (ref 96–108)
CO2 SERPL-SCNC: 29 MMOL/L — SIGNIFICANT CHANGE UP (ref 22–31)
CREAT SERPL-MCNC: 0.35 MG/DL — LOW (ref 0.5–1.3)
FACT VIII ACT/NOR PPP: 175 % — HIGH (ref 60–125)
GLUCOSE SERPL-MCNC: 93 MG/DL — SIGNIFICANT CHANGE UP (ref 70–99)
HCT VFR BLD CALC: 27.6 % — LOW (ref 34.5–45)
HGB BLD-MCNC: 9 G/DL — LOW (ref 11.5–15.5)
INR BLD: 1.15 RATIO — SIGNIFICANT CHANGE UP (ref 0.88–1.16)
MAGNESIUM SERPL-MCNC: 2 MG/DL — SIGNIFICANT CHANGE UP (ref 1.6–2.6)
MCHC RBC-ENTMCNC: 27.9 PG — SIGNIFICANT CHANGE UP (ref 27–34)
MCHC RBC-ENTMCNC: 32.5 GM/DL — SIGNIFICANT CHANGE UP (ref 32–36)
MCV RBC AUTO: 85.9 FL — SIGNIFICANT CHANGE UP (ref 80–100)
NT-PROBNP SERPL-SCNC: 908 PG/ML — HIGH (ref 0–450)
PHOSPHATE SERPL-MCNC: 3.3 MG/DL — SIGNIFICANT CHANGE UP (ref 2.5–4.5)
PLATELET # BLD AUTO: 301 K/UL — SIGNIFICANT CHANGE UP (ref 150–400)
POTASSIUM SERPL-MCNC: 3.5 MMOL/L — SIGNIFICANT CHANGE UP (ref 3.5–5.3)
POTASSIUM SERPL-SCNC: 3.5 MMOL/L — SIGNIFICANT CHANGE UP (ref 3.5–5.3)
PROTHROM AB SERPL-ACNC: 12.6 SEC — SIGNIFICANT CHANGE UP (ref 9.8–12.7)
RBC # BLD: 3.22 M/UL — LOW (ref 3.8–5.2)
RBC # FLD: 14.9 % — HIGH (ref 10.3–14.5)
SODIUM SERPL-SCNC: 143 MMOL/L — SIGNIFICANT CHANGE UP (ref 135–145)
VWF AG ACT/NOR PPP IA: 149 % — SIGNIFICANT CHANGE UP (ref 63–170)
VWF:RCO ACT/NOR PPP PL AGG: 131 % — SIGNIFICANT CHANGE UP (ref 45–133)
WBC # BLD: 4.2 K/UL — SIGNIFICANT CHANGE UP (ref 3.8–10.5)
WBC # FLD AUTO: 4.2 K/UL — SIGNIFICANT CHANGE UP (ref 3.8–10.5)

## 2017-06-01 PROCEDURE — 36430 TRANSFUSION BLD/BLD COMPNT: CPT

## 2017-06-01 PROCEDURE — 84436 ASSAY OF TOTAL THYROXINE: CPT

## 2017-06-01 PROCEDURE — 86905 BLOOD TYPING RBC ANTIGENS: CPT

## 2017-06-01 PROCEDURE — 83605 ASSAY OF LACTIC ACID: CPT

## 2017-06-01 PROCEDURE — P9037: CPT

## 2017-06-01 PROCEDURE — 94760 N-INVAS EAR/PLS OXIMETRY 1: CPT

## 2017-06-01 PROCEDURE — 96374 THER/PROPH/DIAG INJ IV PUSH: CPT | Mod: XU

## 2017-06-01 PROCEDURE — 93005 ELECTROCARDIOGRAM TRACING: CPT

## 2017-06-01 PROCEDURE — 83520 IMMUNOASSAY QUANT NOS NONAB: CPT

## 2017-06-01 PROCEDURE — 96376 TX/PRO/DX INJ SAME DRUG ADON: CPT

## 2017-06-01 PROCEDURE — 85610 PROTHROMBIN TIME: CPT

## 2017-06-01 PROCEDURE — 74020: CPT

## 2017-06-01 PROCEDURE — 81001 URINALYSIS AUTO W/SCOPE: CPT

## 2017-06-01 PROCEDURE — 80053 COMPREHEN METABOLIC PANEL: CPT

## 2017-06-01 PROCEDURE — 84480 ASSAY TRIIODOTHYRONINE (T3): CPT

## 2017-06-01 PROCEDURE — 83735 ASSAY OF MAGNESIUM: CPT

## 2017-06-01 PROCEDURE — 86900 BLOOD TYPING SEROLOGIC ABO: CPT

## 2017-06-01 PROCEDURE — 85245 CLOT FACTOR VIII VW RISTOCTN: CPT

## 2017-06-01 PROCEDURE — 99285 EMERGENCY DEPT VISIT HI MDM: CPT | Mod: 25

## 2017-06-01 PROCEDURE — 85246 CLOT FACTOR VIII VW ANTIGEN: CPT

## 2017-06-01 PROCEDURE — 86870 RBC ANTIBODY IDENTIFICATION: CPT

## 2017-06-01 PROCEDURE — 83880 ASSAY OF NATRIURETIC PEPTIDE: CPT

## 2017-06-01 PROCEDURE — 99233 SBSQ HOSP IP/OBS HIGH 50: CPT

## 2017-06-01 PROCEDURE — P9016: CPT

## 2017-06-01 PROCEDURE — 86901 BLOOD TYPING SEROLOGIC RH(D): CPT

## 2017-06-01 PROCEDURE — 84443 ASSAY THYROID STIM HORMONE: CPT

## 2017-06-01 PROCEDURE — 86036 ANCA SCREEN EACH ANTIBODY: CPT

## 2017-06-01 PROCEDURE — 83036 HEMOGLOBIN GLYCOSYLATED A1C: CPT

## 2017-06-01 PROCEDURE — 71045 X-RAY EXAM CHEST 1 VIEW: CPT

## 2017-06-01 PROCEDURE — 85240 CLOT FACTOR VIII AHG 1 STAGE: CPT

## 2017-06-01 PROCEDURE — 85730 THROMBOPLASTIN TIME PARTIAL: CPT

## 2017-06-01 PROCEDURE — 85027 COMPLETE CBC AUTOMATED: CPT

## 2017-06-01 PROCEDURE — 86880 COOMBS TEST DIRECT: CPT

## 2017-06-01 PROCEDURE — 80048 BASIC METABOLIC PNL TOTAL CA: CPT

## 2017-06-01 PROCEDURE — 86922 COMPATIBILITY TEST ANTIGLOB: CPT

## 2017-06-01 PROCEDURE — 84100 ASSAY OF PHOSPHORUS: CPT

## 2017-06-01 PROCEDURE — 86850 RBC ANTIBODY SCREEN: CPT

## 2017-06-01 RX ORDER — POTASSIUM CHLORIDE 20 MEQ
40 PACKET (EA) ORAL EVERY 4 HOURS
Qty: 0 | Refills: 0 | Status: COMPLETED | OUTPATIENT
Start: 2017-06-01 | End: 2017-06-01

## 2017-06-01 RX ORDER — MORPHINE SULFATE 50 MG/1
1 CAPSULE, EXTENDED RELEASE ORAL EVERY 4 HOURS
Qty: 0 | Refills: 0 | Status: DISCONTINUED | OUTPATIENT
Start: 2017-06-01 | End: 2017-06-01

## 2017-06-01 RX ORDER — DIPHENOXYLATE HCL/ATROPINE 2.5-.025MG
1 TABLET ORAL
Qty: 0 | Refills: 0 | Status: DISCONTINUED | OUTPATIENT
Start: 2017-06-01 | End: 2017-06-01

## 2017-06-01 RX ORDER — MESALAMINE 400 MG
2 TABLET, DELAYED RELEASE (ENTERIC COATED) ORAL
Qty: 0 | Refills: 0 | COMMUNITY
Start: 2017-06-01

## 2017-06-01 RX ADMIN — Medication 40 MILLIEQUIVALENT(S): at 14:29

## 2017-06-01 RX ADMIN — Medication 1 TABLET(S): at 13:22

## 2017-06-01 RX ADMIN — MORPHINE SULFATE 1 MILLIGRAM(S): 50 CAPSULE, EXTENDED RELEASE ORAL at 10:47

## 2017-06-01 RX ADMIN — FAMOTIDINE 20 MILLIGRAM(S): 10 INJECTION INTRAVENOUS at 10:37

## 2017-06-01 RX ADMIN — MORPHINE SULFATE 1 MILLIGRAM(S): 50 CAPSULE, EXTENDED RELEASE ORAL at 06:35

## 2017-06-01 RX ADMIN — MORPHINE SULFATE 1 MILLIGRAM(S): 50 CAPSULE, EXTENDED RELEASE ORAL at 11:02

## 2017-06-01 RX ADMIN — MORPHINE SULFATE 1 MILLIGRAM(S): 50 CAPSULE, EXTENDED RELEASE ORAL at 06:20

## 2017-06-01 RX ADMIN — Medication 40 MILLIEQUIVALENT(S): at 10:37

## 2017-06-01 NOTE — PROGRESS NOTE ADULT - PROBLEM SELECTOR PLAN 2
off ASA  transfuse prn  add pentasa to the regimen  on pepcid as ordered
off ASA  transfuse prn  on pepcid as ordered
states bleeding has stopped

## 2017-06-01 NOTE — H&P ADULT - NSHPREVIEWOFSYSTEMS_GEN_ALL_CORE
severe aortic stenosis  avm severe aortic stenosis refuses surgery,,,recurrent gi bleed  avm  colitis

## 2017-06-01 NOTE — DISCHARGE NOTE ADULT - INSTRUCTIONS
soft diet  f/u with gastro and hematology  repeat hg tomorrow  I spoke to daughter,dr brian [phone] stated she sees mom every day,,,will draw the blood

## 2017-06-01 NOTE — PROGRESS NOTE ADULT - SUBJECTIVE AND OBJECTIVE BOX
Interval events: 2 soft brown BMs overnight, thinks abdominal pain is getting better.  Longer periods inbetween pain medication.  Not tolerating PO, patient asking if she can take Lomotil.    Review of Systems:  Constitutional: no fever, chills  Neuro: no headache, lightheadedness, weakness  Resp: no cough, wheezing, shortness of breath  CVS: no chest pain, palpitations  GI: +abdominal pain, loose BMs after oral intake. no nausea, vomiting.  :   Msk:       T(F): 97.7, Max: 98.8 (05-31 @ 12:10)  HR: 70 (69 - 101)  BP: 102/53 (95/50 - 122/57)  RR: 12 (11 - 27)  SpO2: 96% (83% - 98%)  Wt(kg): --        CAPILLARY BLOOD GLUCOSE    I&O's Summary    I & Os for current day (as of 01 Jun 2017 08:43)  =============================================  IN: 900 ml / OUT: 800 ml / NET: 100 ml      Physical Exam:     Gen: NAD  Neuro: Awake, alert  HEENT: NCAT  CV: +systolic murmur  Pulm: Lungs CTA B/L  GI: +BS x 4 Quads,   Ext:  Skin:    Meds:  morphine  - Injectable 1milliGRAM(s) IV Push every 4 hours PRN  famotidine Injectable 20milliGRAM(s) IV Push every 12 hours  aluminum hydroxide/magnesium hydroxide/simethicone Suspension 30milliLiter(s) Oral every 6 hours PRN  mesalamine ER Capsule 1000milliGRAM(s) Oral two times a day  lactated ringers 1000milliLiter(s) IV Continuous <Continuous>  potassium chloride    Tablet ER 40milliEquivalent(s) Oral every 4 hours                            9.0    4.2   )-----------( 301      ( 01 Jun 2017 06:24 )             27.6       06-01    143  |  107  |  11  ----------------------------<  93  3.5   |  29  |  0.35<L>    Ca    8.7      01 Jun 2017 06:24  Phos  3.3     06-01  Mg     2.0     06-01      PT/INR - ( 01 Jun 2017 06:24 )   PT: 12.6 sec;   INR: 1.15 ratio         CENTRAL LINE: N   DATE INSERTED:   REMOVE: Y/N    LOPEZ: N      DATE INSERTED:        REMOVE: Y/N    A-LINE: N     DATE INSERTED:              REMOVE: Y/N    GLOBAL ISSUE/BEST PRACTICE:  Analgesia: Morphine  Sedation: None  HOB elevation: yes  Stress ulcer prophylaxis: Famotidine  VTE prophylaxis: SCDs  Glycemic control: None  Nutrition: Moderate Soft    CODE STATUS: *** Interval events: 2 soft brown BMs overnight, thinks abdominal pain is getting better.  Longer periods inbetween pain medication.  Not tolerating PO, patient asking if she can take Lomotil.    Review of Systems:  Constitutional: no fever, chills  Neuro: no headache, lightheadedness, weakness  Resp: no cough, wheezing, shortness of breath  CVS: no chest pain, palpitations  GI: +abdominal pain, loose BMs after oral intake. no nausea, vomiting.  : no dysuria, urgency, frequency  Msk: chronic right leg pain      T(F): 97.7, Max: 98.8 (05-31 @ 12:10)  HR: 70 (69 - 101)  BP: 102/53 (95/50 - 122/57)  RR: 12 (11 - 27)  SpO2: 96% (83% - 98%)  Wt(kg): --      CAPILLARY BLOOD GLUCOSE    I&O's Summary    I & Os for current day (as of 01 Jun 2017 08:43)  =============================================  IN: 900 ml / OUT: 800 ml / NET: 100 ml      Physical Exam:     Gen: NAD  Neuro: Awake, alert  HEENT: NCAT  CV: +systolic murmur  Pulm: Lungs CTA B/L  GI: +BS x 4 Quads, nontender RUQ/RUL; refused palpation L side  Ext: Right leg brace to thigh. Compression stocking L leg.       Meds:  morphine  - Injectable 1milliGRAM(s) IV Push every 4 hours PRN  famotidine Injectable 20milliGRAM(s) IV Push every 12 hours  aluminum hydroxide/magnesium hydroxide/simethicone Suspension 30milliLiter(s) Oral every 6 hours PRN  mesalamine ER Capsule 1000milliGRAM(s) Oral two times a day  lactated ringers 1000milliLiter(s) IV Continuous <Continuous>  potassium chloride    Tablet ER 40milliEquivalent(s) Oral every 4 hours                            9.0    4.2   )-----------( 301      ( 01 Jun 2017 06:24 )             27.6       06-01    143  |  107  |  11  ----------------------------<  93  3.5   |  29  |  0.35<L>    Ca    8.7      01 Jun 2017 06:24  Phos  3.3     06-01  Mg     2.0     06-01      PT/INR - ( 01 Jun 2017 06:24 )   PT: 12.6 sec;   INR: 1.15 ratio         CENTRAL LINE: N   DATE INSERTED:   REMOVE: Y/N    LOPEZ: N      DATE INSERTED:        REMOVE: Y/N    A-LINE: N     DATE INSERTED:              REMOVE: Y/N    GLOBAL ISSUE/BEST PRACTICE:  Analgesia: Morphine  Sedation: None  HOB elevation: yes  Stress ulcer prophylaxis: Famotidine  VTE prophylaxis: SCDs  Glycemic control: None  Nutrition: Moderate Soft    CODE STATUS: *** Interval events: 2 soft brown BMs overnight, thinks abdominal pain is getting better.  Longer periods inbetween pain medication.  Not tolerating PO, patient asking if she can take Lomotil.    Review of Systems:  Constitutional: no fever, chills  Neuro: no headache, lightheadedness, weakness  Resp: no cough, wheezing, shortness of breath  CVS: no chest pain, palpitations  GI: +abdominal pain, loose BMs after oral intake. no nausea, vomiting.  : no dysuria, urgency, frequency  Msk: chronic right leg pain      T(F): 97.7, Max: 98.8 (05-31 @ 12:10)  HR: 70 (69 - 101)  BP: 102/53 (95/50 - 122/57)  RR: 12 (11 - 27)  SpO2: 96% (83% - 98%)  Wt(kg): --      CAPILLARY BLOOD GLUCOSE    I&O's Summary    I & Os for current day (as of 01 Jun 2017 08:43)  =============================================  IN: 900 ml / OUT: 800 ml / NET: 100 ml      Physical Exam:     Gen: NAD  Neuro: Awake, alert  HEENT: NCAT  CV: +systolic murmur  Pulm: Lungs CTA B/L  GI: +BS x 4 Quads, nontender RUQ/RUL; refused palpation L side  Ext: Right leg brace to thigh. Compression stocking L leg.       Meds:  morphine  - Injectable 1milliGRAM(s) IV Push every 4 hours PRN  famotidine Injectable 20milliGRAM(s) IV Push every 12 hours  aluminum hydroxide/magnesium hydroxide/simethicone Suspension 30milliLiter(s) Oral every 6 hours PRN  mesalamine ER Capsule 1000milliGRAM(s) Oral two times a day  lactated ringers 1000milliLiter(s) IV Continuous <Continuous>  potassium chloride    Tablet ER 40milliEquivalent(s) Oral every 4 hours                            9.0    4.2   )-----------( 301      ( 01 Jun 2017 06:24 )             27.6       06-01    143  |  107  |  11  ----------------------------<  93  3.5   |  29  |  0.35<L>    Ca    8.7      01 Jun 2017 06:24  Phos  3.3     06-01  Mg     2.0     06-01      PT/INR - ( 01 Jun 2017 06:24 )   PT: 12.6 sec;   INR: 1.15 ratio         CENTRAL LINE: N       LOPEZ: N        A-LINE: N      GLOBAL ISSUE/BEST PRACTICE:  Analgesia: Morphine  Sedation: None  HOB elevation: yes  Stress ulcer prophylaxis: Famotidine  VTE prophylaxis: SCDs  Glycemic control: None  Nutrition: Moderate Soft    CODE STATUS: full

## 2017-06-01 NOTE — PROGRESS NOTE ADULT - ASSESSMENT
77 y/o woman with PMH significant for blood loss anemia ( AVMs), AS and anemia presented with BRBPR. which has now stopped  hgb is relatively stable  hx of aortic stenosis
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
## 2017-06-01 NOTE — DISCHARGE NOTE ADULT - MEDICATION SUMMARY - MEDICATIONS TO CHANGE
I will SWITCH the dose or number of times a day I take the medications listed below when I get home from the hospital:    atropine-diphenoxylate 0.025 mg-2.5 mg oral tablet  -- 1 tab(s) by mouth every 12 hours, As needed, Diarrhea

## 2017-06-01 NOTE — DISCHARGE NOTE ADULT - PATIENT PORTAL LINK FT
“You can access the [employer] Patient Portal, offered by [facility], by registering with the following website: [email or web address]”

## 2017-06-01 NOTE — PROGRESS NOTE ADULT - PROVIDER SPECIALTY LIST ADULT
Cardiology
Cardiology
Critical Care
Gastroenterology
Heme/Onc
Internal Medicine
Pulmonology
Cardiology
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
## 2017-06-01 NOTE — PROGRESS NOTE ADULT - SUBJECTIVE AND OBJECTIVE BOX
[facility]    Arlni Moody, Juliane Merino, Liborio Daly      [phone]    CHIEF COMPLAINT: Patient is a 76y old  Female who presents with a chief complaint of     Follow Up: severe as, pulm htn, gib    Interim history: Ongoing diarrhea issues,   Denies chest discomfort and shortness of breath.  Persistent left abdominal pain.  No new neurologic symptoms.      MEDICATIONS  (STANDING):  famotidine Injectable 20milliGRAM(s) IV Push every 12 hours  mesalamine ER Capsule 1000milliGRAM(s) Oral two times a day  lactated ringers 1000milliLiter(s) IV Continuous <Continuous>    MEDICATIONS  (PRN):  morphine  - Injectable 1milliGRAM(s) IV Push every 1 hour PRN Moderate Pain (4 - 6)  aluminum hydroxide/magnesium hydroxide/simethicone Suspension 30milliLiter(s) Oral every 6 hours PRN Dyspepsia      REVIEW OF SYSTEMS:  eye, ent, GI, , allergic, dermatologic, musculoskeletal and neurologic are negative except as described above    Vital Signs Last 24 Hrs  T(C): 36.9, Max: 37.1 (05-31 @ 12:10)  T(F): 98.4, Max: 98.8 (05-31 @ 12:10)  HR: 73 (71 - 101)  BP: 97/53 (95/50 - 122/57)  BP(mean): 70 (68 - 90)  RR: 13 (11 - 27)  SpO2: 95% (93% - 98%)    I&O's Summary  I & Os for 24h ending 31 May 2017 07:00  =============================================  IN: 1690 ml / OUT: 2400 ml / NET: -710 ml    I & Os for current day (as of 01 Jun 2017 06:43)  =============================================  IN: 900 ml / OUT: 800 ml / NET: 100 ml      Telemetry past 24h: sr, brief psvt    PHYSICAL EXAM:    Constitutional: thin NAD   HEENT:  MMM, sclerae anicteric, conjunctivae clear, no oral cyanosis.  Pulmonary: Non-labored, breath sounds are clear bilaterally, No wheezing, rales or rhonchi  Cardiovascular: Regular, S1 and S2, 3/6 sys murmur of severe AS, no rubs, gallops or clicks  Gastrointestinal: Bowel Sounds present, soft, llq tender.   Lymph: No peripheral edema.   Neurological: Alert, no focal deficits  Skin: No rashes.  Psych:  Mood & affect appropriate    LABS: All Labs Reviewed:                        10.6   13.4  )-----------( 362      ( 31 May 2017 06:00 )             33.1                         8.3    7.2   )-----------( 287      ( 30 May 2017 05:46 )             26.2                         8.4    5.9   )-----------( 266      ( 29 May 2017 07:04 )             26.3     31 May 2017 06:00    143    |  104    |  10     ----------------------------<  88     3.3     |  28     |  0.41   31 May 2017 00:47    144    |  106    |  8      ----------------------------<  83     3.3     |  27     |  0.36   30 May 2017 05:46    145    |  109    |  13     ----------------------------<  77     3.4     |  25     |  0.31     Ca    9.1        31 May 2017 06:00  Ca    9.0        31 May 2017 00:47  Ca    8.2        30 May 2017 05:46  Phos  3.7       31 May 2017 06:00  Phos  3.4       31 May 2017 00:47  Phos  2.8       30 May 2017 05:46  Mg     2.2       31 May 2017 06:00  Mg     2.1       31 May 2017 00:47  Mg     2.1       30 May 2017 05:46      PT/INR - ( 01 Jun 2017 06:24 )   PT: 12.6 sec;   INR: 1.15 ratio               Blood Culture:     05-31 @ 00:47  TSH: 4.79      RADIOLOGY:    EKG:    Echo:       EXAM:  ECHO TTE W/O CON COMP W/DOPPLR           PROCEDURE DATE:  11/25/2015      INTERPRETATION:  Ordering Physician: PEPE LINTON    Indication: Aortic stenosis    Technician: DARCI    Study Quality: Good   A complete echocardiographic study was performed utilizing standard   protocol including spectral and color Doppler in all echocardiographic   windows.    Height: 167 cm  Weight: 60 kg  BSA: 1.6 sq m  Blood Pressure: 124/58    MEASUREMENTS  IVS: 1.2  PWT: 1.2  LA: 4.4  AO: 2.8  LVIDd: 4.0  LVIDs: 2.2  LVOT: 2.1    LVEF: 55%  RVSP: 70 mmHg  RA Pressure: 10 mmHg  IVC: IVC not well seen.    FINDINGS  Left Ventricle: Normal size with satisfactory contractility. LVH  Right Ventricle: Normal size with satisfactory contractility  Left Atrium: Mildly dilated  Right Atrium: Normal size  Mitral Valve: Calcified annulus with mild MR. Mean gradient 7.5 mmHg   consistent with moderate stenosis. Mild MR.  Aortic Valve: Calcified leaflets with decreased systolic opening. Peak   gradient 86 mmHg with a calculated valve area 0.6 sq cm. Mild AI.  Tricuspid Valve: Moderate to severe TR  Pulmonic Valve: Mild PI  Diastolic Function: E/E'=20, consistent with elevated LV filling pressure  Pericardium/Pleura: No significant pericardial or pleural effusion      CONCLUSIONS:  Normal sized left ventricle with satisfactory contractility. LVH, stage   II diastolic dysfunction. Severe aortic stenosis with mild aortic   insufficiency. Calcified mitral annulus with moderate functional mitral   stenosis. Mild MR. Severe pulmonary hypertension.                 CHELI MOODY M.D., ATTENDING CARDIOLOGIST  This document has been electronically signed. Nov 26 2015  1:12P

## 2017-06-01 NOTE — DISCHARGE NOTE ADULT - CARE PLAN
Principal Discharge DX:	Gastrointestinal hemorrhage, unspecified gastrointestinal hemorrhage type  Goal:	s/p blood transfusion Principal Discharge DX:	Gastrointestinal hemorrhage, unspecified gastrointestinal hemorrhage type  Goal:	s/p blood transfusion  Instructions for follow-up, activity and diet:	repeat h/h tomorrow with daughter  f/u with gi  continue asacol formulation for loose stools  p-anca added and ASCA  f/u with hematology for venofer iv

## 2017-06-01 NOTE — DISCHARGE NOTE ADULT - MEDICATION SUMMARY - MEDICATIONS TO TAKE
I will START or STAY ON the medications listed below when I get home from the hospital:    mesalamine 500 mg oral capsule, extended release  -- 2 cap(s) by mouth 2 times a day  -- Indication: For Bleeding

## 2017-06-01 NOTE — DISCHARGE NOTE ADULT - ADDITIONAL INSTRUCTIONS
f/u with daughter to have hg drawn tomorrow,,,daughter agreed  f/u with gi and hematology for continued w/u for recurrent gi bleeds and diarrhea

## 2017-06-01 NOTE — PROGRESS NOTE ADULT - SUBJECTIVE AND OBJECTIVE BOX
Interval History: states bleeding has stopped    Chart reviewed and events noted;   Overnight events:    MEDICATIONS  (STANDING):  famotidine Injectable 20milliGRAM(s) IV Push every 12 hours  mesalamine ER Capsule 1000milliGRAM(s) Oral two times a day  lactated ringers 1000milliLiter(s) IV Continuous <Continuous>  potassium chloride    Tablet ER 40milliEquivalent(s) Oral every 4 hours    MEDICATIONS  (PRN):  aluminum hydroxide/magnesium hydroxide/simethicone Suspension 30milliLiter(s) Oral every 6 hours PRN Dyspepsia  morphine  - Injectable 1milliGRAM(s) IV Push every 4 hours PRN Moderate Pain (4 - 6)  diphenoxylate/atropine 1Tablet(s) Oral two times a day PRN Diarrhea      Vital Signs Last 24 Hrs  T(C): 36.6, Max: 36.9 (05-31 @ 15:40)  T(F): 97.8, Max: 98.4 (05-31 @ 15:40)  HR: 73 (69 - 97)  BP: 97/55 (95/50 - 119/66)  BP(mean): 72 (68 - 90)  RR: 18 (11 - 39)  SpO2: 97% (83% - 98%)    PHYSICAL EXAM  General: adult in NAD  HEENT: clear oropharynx, anicteric sclera, pink conjunctivae  Neck: supple  CV: normal S1S2 with no murmur rubs or gallops  Lungs: clear to auscultation, no wheezes, no rhales  Abdomen: soft non-tender non-distended, no hepato/splenomegaly  Ext: no clubbing cyanosis or edema. right foot with boot  Skin: no rashes and no petichiae  Neuro: alert and oriented X3 no focal deficits      LABS:  CBC Full  -  ( 01 Jun 2017 06:24 )  WBC Count : 4.2 K/uL  Hemoglobin : 9.0 g/dL  Hematocrit : 27.6 %  Platelet Count - Automated : 301 K/uL  Mean Cell Volume : 85.9 fl  Mean Cell Hemoglobin : 27.9 pg  Mean Cell Hemoglobin Concentration : 32.5 gm/dL  Auto Neutrophil # : x  Auto Lymphocyte # : x  Auto Monocyte # : x  Auto Eosinophil # : x  Auto Basophil # : x  Auto Neutrophil % : x  Auto Lymphocyte % : x  Auto Monocyte % : x  Auto Eosinophil % : x  Auto Basophil % : x    06-01    143  |  107  |  11  ----------------------------<  93  3.5   |  29  |  0.35<L>    Ca    8.7      01 Jun 2017 06:24  Phos  3.3     06-01  Mg     2.0     06-01      PT/INR - ( 01 Jun 2017 06:24 )   PT: 12.6 sec;   INR: 1.15 ratio               RADIOLOGY & ADDITIONAL STUDIES:

## 2017-06-01 NOTE — DISCHARGE NOTE ADULT - HOSPITAL COURSE
patient came in because she was dizzy,,,did not pass out,,,hg 7,,,s/p prbc and platelets,,,also complaining for many months diarrhea,,,,,was seen by gastro/hematology/pulmonary and cardiac,,,,vonwillebrand labs normal bnp negative coags normal,,,i sent p-anca and asca,,,continue asacol formulation,,hg stable at 9.0,,,has been eating but large amounts,,refuses repair aortic stenosis patient came in because she was dizzy,,,did not pass out,,,hg 7,,,s/p prbc and platelets,,,also complaining for many months diarrhea,,,,,was seen by gastro/hematology/pulmonary and cardiac,,,,vonwillebrand labs normal bnp negative coags normal,,,i sent p-anca and asca,,,continue asacol formulation,,hg stable at 9.0,,,has been eating but large amounts,,refuses repair aortic stenosis,no s/s of chf,,not short of breath

## 2017-06-01 NOTE — H&P ADULT - NSHPPHYSICALEXAM_GEN_ALL_CORE
alert in nad  vss stable  lungs clear  hrt reg loud systoloic murmer  abd soft  ext wnl  refused guaic

## 2017-06-01 NOTE — PROGRESS NOTE ADULT - PROBLEM SELECTOR PLAN 1
extensive GI work up in the past  continue ppi   consider octreotide 100 mcg bid  pentasa q 8 hours one gram and also lomotil one tab tid as needed  monitor cbc ;transfuse prn.
extensive GI work up in the past  continue ppi   consider octreotide 100 mcg bid  monitor cbc ;transfuse prn.
no further BRBPR  pt w/o BM since admission  consider start patient on clear liquids
s/p 1 unit prbc and platelets  will start feeding in the am  refuses all intake  patient sees hematology as out pt  s/p venofer  patient unclear is juan was looked in to in the past  hematology eval in the am  ?heydes syndrom
no further BRBPR  pt w/o BM since admission  consider start patient on clear liquids
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

## 2017-06-01 NOTE — H&P ADULT - PROBLEM SELECTOR PLAN 1
icu eval  h/h serial  transfuse  gi eval  hem eval  s/p previous cardiology w/u at [place]  refuses to repair severe A.S.

## 2017-06-01 NOTE — PROGRESS NOTE ADULT - ASSESSMENT
76 year old woman with known PAF, no AC secondary to multiple GI bleeds in the past, multiple endoscopic procedures, severe aortic stenosis, severe pulmonary hypertension, RSD who presents to the ED with BRBPR.     - Monitor serial H/H and transfuse to H/H greater than 7. Currently stable.   - Hold antiplatelets and anticoagulants  -longstanding severe AS, has denied symptoms and has refused to consider intervention.  I have encouraged her to follow up as an outpt to discuss this further.  - Further cardiac workup will depend on clinical course.   - All other workup per primary team. Will followup.   -given her bleed with severe valvular heart disease, remains at risk of abrupt decompensation.  Can tx to tele for close obs.    35 minutes of critical care time was spent with this patient.

## 2017-06-01 NOTE — PROGRESS NOTE ADULT - SUBJECTIVE AND OBJECTIVE BOX
INTERVAL HPI/OVERNIGHT EVENTS:  HPI:  No new overnight event.  No N/V/D.  Tolerating diet.  on lomotil without any bleeding    MEDICATIONS  (STANDING):  famotidine Injectable 20milliGRAM(s) IV Push every 12 hours  mesalamine ER Capsule 1000milliGRAM(s) Oral two times a day    MEDICATIONS  (PRN):  aluminum hydroxide/magnesium hydroxide/simethicone Suspension 30milliLiter(s) Oral every 6 hours PRN Dyspepsia  morphine  - Injectable 1milliGRAM(s) IV Push every 4 hours PRN Moderate Pain (4 - 6)  diphenoxylate/atropine 1Tablet(s) Oral two times a day PRN Diarrhea      Allergies    penicillin (Unknown)  Plavix (Unknown)  sulfa drugs (Unknown)    Intolerances          General:  No wt loss, fevers, chills, night sweats, fatigue,   Eyes:  Good vision, no reported pain  ENT:  No sore throat, pain, runny nose, dysphagia  CV:  No pain, palpitations, hypo/hypertension  Resp:  No dyspnea, cough, tachypnea, wheezing  GI:  No pain, No nausea, No vomiting, No diarrhea, No constipation, No weight loss, No fever, No pruritis, No rectal bleeding, No tarry stools, No dysphagia,  :  No pain, bleeding, incontinence, nocturia  Muscle:  No pain, weakness  Neuro:  No weakness, tingling, memory problems  Psych:  No fatigue, insomnia, mood problems, depression  Endocrine:  No polyuria, polydipsia, cold/heat intolerance  Heme:  No petechiae, ecchymosis, easy bruisability  Skin:  No rash, tattoos, scars, edema      PHYSICAL EXAM:   Vital Signs:  Vital Signs Last 24 Hrs  T(C): 36.4, Max: 36.9 ( @ 23:20)  T(F): 97.5, Max: 98.4 ( @ 23:20)  HR: 76 (69 - 96)  BP: 115/60 (95/50 - 154/66)  BP(mean): 78 (68 - 95)  RR: 16 (11 - 39)  SpO2: 90% (83% - 98%)  Daily     Daily Weight in k.7 (2017 08:00)I&O's Summary  I & Os for 24h ending 2017 07:00  =============================================  IN: 900 ml / OUT: 800 ml / NET: 100 ml    I & Os for current day (as of 2017 22:35)  =============================================  IN: 800 ml / OUT: 1250 ml / NET: -450 ml      GENERAL:  Appears stated age, well-groomed, well-nourished, no distress  HEENT:  NC/AT,  conjunctivae clear and pink, no thyromegaly, nodules, adenopathy, no JVD, sclera -anicteric  CHEST:  Full & symmetric excursion, no increased effort, breath sounds clear  HEART:  Regular rhythm, S1, S2, no murmur/rub/S3/S4, no abdominal bruit, no edema  ABDOMEN:  Soft, non-tender, non-distended, normoactive bowel sounds,  no masses ,no hepato-splenomegaly, no signs of chronic liver disease  EXTEREMITIES:  no cyanosis,clubbing or edema  SKIN:  No rash/erythema/ecchymoses/petechiae/wounds/abscess/warm/dry  NEURO:  Alert, oriented, no asterixis, no tremor, no encephalopathy      LABS:                        9.0    4.2   )-----------( 301      ( 2017 06:24 )             27.6     06-01    143  |  107  |  11  ----------------------------<  93  3.5   |  29  |  0.35<L>    Ca    8.7      2017 06:24  Phos  3.3     06-  Mg     2.0     06-      PT/INR - ( 2017 06:24 )   PT: 12.6 sec;   INR: 1.15 ratio             amylase   lipase  RADIOLOGY & ADDITIONAL TESTS:
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
## 2017-06-01 NOTE — DISCHARGE NOTE ADULT - PLAN OF CARE
s/p blood transfusion repeat h/h tomorrow with daughter  f/u with gi  continue asacol formulation for loose stools  p-anca added and ASCA  f/u with hematology for venofer iv

## 2017-06-01 NOTE — PROGRESS NOTE ADULT - PROBLEM SELECTOR PROBLEM 1
Gastrointestinal hemorrhage, unspecified gastrointestinal hemorrhage type
Anemia due to other cause
BRBPR (bright red blood per rectum)
BRBPR (bright red blood per rectum)
Gastrointestinal hemorrhage, unspecified gastrointestinal hemorrhage type
Gastrointestinal hemorrhage, unspecified gastrointestinal hemorrhage type

## 2017-06-05 DIAGNOSIS — E46 UNSPECIFIED PROTEIN-CALORIE MALNUTRITION: ICD-10-CM

## 2017-06-05 DIAGNOSIS — G90.523 COMPLEX REGIONAL PAIN SYNDROME I OF LOWER LIMB, BILATERAL: ICD-10-CM

## 2017-06-05 DIAGNOSIS — Z88.2 ALLERGY STATUS TO SULFONAMIDES: ICD-10-CM

## 2017-06-05 DIAGNOSIS — I50.32 CHRONIC DIASTOLIC (CONGESTIVE) HEART FAILURE: ICD-10-CM

## 2017-06-05 DIAGNOSIS — K92.2 GASTROINTESTINAL HEMORRHAGE, UNSPECIFIED: ICD-10-CM

## 2017-06-05 DIAGNOSIS — I95.1 ORTHOSTATIC HYPOTENSION: ICD-10-CM

## 2017-06-05 DIAGNOSIS — I35.0 NONRHEUMATIC AORTIC (VALVE) STENOSIS: ICD-10-CM

## 2017-06-05 DIAGNOSIS — D62 ACUTE POSTHEMORRHAGIC ANEMIA: ICD-10-CM

## 2017-06-05 DIAGNOSIS — I48.91 UNSPECIFIED ATRIAL FIBRILLATION: ICD-10-CM

## 2017-06-05 DIAGNOSIS — E87.2 ACIDOSIS: ICD-10-CM

## 2017-06-05 DIAGNOSIS — I11.0 HYPERTENSIVE HEART DISEASE WITH HEART FAILURE: ICD-10-CM

## 2017-06-05 DIAGNOSIS — E87.6 HYPOKALEMIA: ICD-10-CM

## 2017-06-05 DIAGNOSIS — Z88.1 ALLERGY STATUS TO OTHER ANTIBIOTIC AGENTS STATUS: ICD-10-CM

## 2017-11-24 ENCOUNTER — EMERGENCY (EMERGENCY)
Facility: HOSPITAL | Age: 77
LOS: 1 days | Discharge: HOME | End: 2017-11-24
Attending: EMERGENCY MEDICINE | Admitting: EMERGENCY MEDICINE
Payer: COMMERCIAL

## 2017-11-24 VITALS
DIASTOLIC BLOOD PRESSURE: 72 MMHG | SYSTOLIC BLOOD PRESSURE: 149 MMHG | TEMPERATURE: 98 F | OXYGEN SATURATION: 97 % | HEART RATE: 78 BPM | RESPIRATION RATE: 16 BRPM

## 2017-11-24 VITALS — WEIGHT: 119.93 LBS | RESPIRATION RATE: 30 BRPM | HEIGHT: 66 IN

## 2017-11-24 DIAGNOSIS — I50.9 HEART FAILURE, UNSPECIFIED: ICD-10-CM

## 2017-11-24 DIAGNOSIS — Z88.0 ALLERGY STATUS TO PENICILLIN: ICD-10-CM

## 2017-11-24 DIAGNOSIS — Z90.49 ACQUIRED ABSENCE OF OTHER SPECIFIED PARTS OF DIGESTIVE TRACT: ICD-10-CM

## 2017-11-24 DIAGNOSIS — I48.91 UNSPECIFIED ATRIAL FIBRILLATION: ICD-10-CM

## 2017-11-24 DIAGNOSIS — Z88.2 ALLERGY STATUS TO SULFONAMIDES: ICD-10-CM

## 2017-11-24 DIAGNOSIS — R06.89 OTHER ABNORMALITIES OF BREATHING: ICD-10-CM

## 2017-11-24 LAB
ALBUMIN SERPL ELPH-MCNC: 3.1 G/DL — LOW (ref 3.3–5)
ALP SERPL-CCNC: 99 U/L — SIGNIFICANT CHANGE UP (ref 40–120)
ALT FLD-CCNC: 46 U/L — SIGNIFICANT CHANGE UP (ref 12–78)
ANION GAP SERPL CALC-SCNC: 9 MMOL/L — SIGNIFICANT CHANGE UP (ref 5–17)
APTT BLD: 30.8 SEC — SIGNIFICANT CHANGE UP (ref 27.5–37.4)
AST SERPL-CCNC: 48 U/L — HIGH (ref 15–37)
BASOPHILS # BLD AUTO: 0.1 K/UL — SIGNIFICANT CHANGE UP (ref 0–0.2)
BASOPHILS NFR BLD AUTO: 0.8 % — SIGNIFICANT CHANGE UP (ref 0–2)
BILIRUB SERPL-MCNC: 0.4 MG/DL — SIGNIFICANT CHANGE UP (ref 0.2–1.2)
BUN SERPL-MCNC: 23 MG/DL — SIGNIFICANT CHANGE UP (ref 7–23)
CALCIUM SERPL-MCNC: 8.6 MG/DL — SIGNIFICANT CHANGE UP (ref 8.5–10.1)
CHLORIDE SERPL-SCNC: 110 MMOL/L — HIGH (ref 96–108)
CO2 SERPL-SCNC: 21 MMOL/L — LOW (ref 22–31)
CREAT SERPL-MCNC: 0.89 MG/DL — SIGNIFICANT CHANGE UP (ref 0.5–1.3)
EOSINOPHIL # BLD AUTO: 0.1 K/UL — SIGNIFICANT CHANGE UP (ref 0–0.5)
EOSINOPHIL NFR BLD AUTO: 1.7 % — SIGNIFICANT CHANGE UP (ref 0–6)
GLUCOSE SERPL-MCNC: 142 MG/DL — HIGH (ref 70–99)
HCT VFR BLD CALC: 44.1 % — SIGNIFICANT CHANGE UP (ref 34.5–45)
HGB BLD-MCNC: 13.4 G/DL — SIGNIFICANT CHANGE UP (ref 11.5–15.5)
INR BLD: 1.07 RATIO — SIGNIFICANT CHANGE UP (ref 0.88–1.16)
LYMPHOCYTES # BLD AUTO: 0.8 K/UL — LOW (ref 1–3.3)
LYMPHOCYTES # BLD AUTO: 10.6 % — LOW (ref 13–44)
MCHC RBC-ENTMCNC: 28 PG — SIGNIFICANT CHANGE UP (ref 27–34)
MCHC RBC-ENTMCNC: 30.4 GM/DL — LOW (ref 32–36)
MCV RBC AUTO: 92.1 FL — SIGNIFICANT CHANGE UP (ref 80–100)
MONOCYTES # BLD AUTO: 0.4 K/UL — SIGNIFICANT CHANGE UP (ref 0–0.9)
MONOCYTES NFR BLD AUTO: 4.8 % — SIGNIFICANT CHANGE UP (ref 1–9)
NEUTROPHILS # BLD AUTO: 6.2 K/UL — SIGNIFICANT CHANGE UP (ref 1.8–7.4)
NEUTROPHILS NFR BLD AUTO: 82.2 % — HIGH (ref 43–77)
PLATELET # BLD AUTO: 263 K/UL — SIGNIFICANT CHANGE UP (ref 150–400)
POTASSIUM SERPL-MCNC: 4.8 MMOL/L — SIGNIFICANT CHANGE UP (ref 3.5–5.3)
POTASSIUM SERPL-SCNC: 4.8 MMOL/L — SIGNIFICANT CHANGE UP (ref 3.5–5.3)
PROT SERPL-MCNC: 7.2 G/DL — SIGNIFICANT CHANGE UP (ref 6–8.3)
PROTHROM AB SERPL-ACNC: 11.7 SEC — SIGNIFICANT CHANGE UP (ref 9.8–12.7)
RBC # BLD: 4.79 M/UL — SIGNIFICANT CHANGE UP (ref 3.8–5.2)
RBC # FLD: 16.7 % — HIGH (ref 10.3–14.5)
SODIUM SERPL-SCNC: 140 MMOL/L — SIGNIFICANT CHANGE UP (ref 135–145)
WBC # BLD: 7.5 K/UL — SIGNIFICANT CHANGE UP (ref 3.8–10.5)
WBC # FLD AUTO: 7.5 K/UL — SIGNIFICANT CHANGE UP (ref 3.8–10.5)

## 2017-11-24 PROCEDURE — 94640 AIRWAY INHALATION TREATMENT: CPT

## 2017-11-24 PROCEDURE — 99285 EMERGENCY DEPT VISIT HI MDM: CPT

## 2017-11-24 PROCEDURE — 93005 ELECTROCARDIOGRAM TRACING: CPT

## 2017-11-24 PROCEDURE — 83880 ASSAY OF NATRIURETIC PEPTIDE: CPT

## 2017-11-24 PROCEDURE — 85610 PROTHROMBIN TIME: CPT

## 2017-11-24 PROCEDURE — 71045 X-RAY EXAM CHEST 1 VIEW: CPT

## 2017-11-24 PROCEDURE — 85027 COMPLETE CBC AUTOMATED: CPT

## 2017-11-24 PROCEDURE — 84484 ASSAY OF TROPONIN QUANT: CPT

## 2017-11-24 PROCEDURE — 85730 THROMBOPLASTIN TIME PARTIAL: CPT

## 2017-11-24 PROCEDURE — 80053 COMPREHEN METABOLIC PANEL: CPT

## 2017-11-24 PROCEDURE — 99285 EMERGENCY DEPT VISIT HI MDM: CPT | Mod: 25

## 2017-11-24 PROCEDURE — 82550 ASSAY OF CK (CPK): CPT

## 2017-11-24 PROCEDURE — 71010: CPT | Mod: 26

## 2017-11-24 PROCEDURE — 99284 EMERGENCY DEPT VISIT MOD MDM: CPT | Mod: 25

## 2017-11-24 PROCEDURE — 82553 CREATINE MB FRACTION: CPT

## 2017-11-24 PROCEDURE — 96374 THER/PROPH/DIAG INJ IV PUSH: CPT

## 2017-11-24 RX ORDER — FUROSEMIDE 40 MG
1 TABLET ORAL
Qty: 30 | Refills: 0 | OUTPATIENT
Start: 2017-11-24 | End: 2017-12-24

## 2017-11-24 RX ORDER — ASPIRIN/CALCIUM CARB/MAGNESIUM 324 MG
1 TABLET ORAL
Qty: 0 | Refills: 0 | COMMUNITY

## 2017-11-24 RX ORDER — FUROSEMIDE 40 MG
20 TABLET ORAL ONCE
Qty: 0 | Refills: 0 | Status: COMPLETED | OUTPATIENT
Start: 2017-11-24 | End: 2017-11-24

## 2017-11-24 RX ORDER — IPRATROPIUM/ALBUTEROL SULFATE 18-103MCG
3 AEROSOL WITH ADAPTER (GRAM) INHALATION ONCE
Qty: 0 | Refills: 0 | Status: COMPLETED | OUTPATIENT
Start: 2017-11-24 | End: 2017-11-24

## 2017-11-24 RX ADMIN — Medication 3 MILLILITER(S): at 09:24

## 2017-11-24 RX ADMIN — Medication 3 MILLILITER(S): at 08:15

## 2017-11-24 RX ADMIN — Medication 20 MILLIGRAM(S): at 09:24

## 2017-11-24 NOTE — ED ADULT NURSE REASSESSMENT NOTE - NS ED NURSE REASSESS COMMENT FT1
Pt's daughter (who is a physician) at bedside continually giving orders to the RN in the care and treatment of pt. RN awaiting further orders from ED Dr. Vanegas breathing has improved since administration of brerathing treatment
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
## 2017-11-24 NOTE — CONSULT NOTE ADULT - ASSESSMENT
77 year old female with known severe AS and GI bleeding p/w wheezing and shortness of breath. She initially believed this to be an allergic reaction. SOB has resolved with nebulizers and IV lasix.  Exam notable for a significant systolic murmur, but lungs generally clear. EKG non-diagnostic. BP at goal. Not significantly volume overloaded.  Most recent echo on record with severe AS.  Labs notable for elevated BNP. Cardiac enzymes within normal limits  I have explained to her and the daughter that there is some evidence of fluid overload. She would likely benefit from a TAVR, and should be further evaluated. I have also explained that there is a relationship between GI bleeding and significant AS.  Start Lasix 20 mg po daily.  Continue with ASA  She must return to the ER if any additional symptoms. She will follow up with her primary cardiologist.

## 2017-11-24 NOTE — ED ADULT NURSE NOTE - OBJECTIVE STATEMENT
pt arrived to ED with family, did not want to register pt to be seen, demanded to see the "Attending", and states "She's not able to breath". PT was rushed into a room, EKG performed, pt was given breathing treatment, blood work sent. Pt's breathing improved after treatment. Pt states she bought new pillow and slept with it last night and felt increasing of diff breathing as the night went on, was not able to breath upon waking, called her daughter (who is a phys) and brought her to the ED, afebrile, wheezy bi-laterally, 3LNC 97%, will continue to monitor

## 2017-11-24 NOTE — ED PROVIDER NOTE - MUSCULOSKELETAL, MLM
Spine appears normal, RLE in brace (pt declining removal), otherwise range of motion is not limited, no muscle or joint tenderness

## 2017-11-24 NOTE — ED PROVIDER NOTE - PROGRESS NOTE DETAILS
savella (dimitrios) seen eval pt, cleared for d/c home with lasix 20mg daily and f/u in office. Reevaluated patient at bedside.  Patient feeling improved, ambulating in er without symptoms.  Discussed the results of all diagnostic testing in ED and copies of all reports given.   An opportunity to ask questions was given.  Discussed the importance of prompt, close medical follow-up.  Patient will return with any changes, concerns or persistent / worsening symptoms.  Understanding of all instructions verbalized.
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
## 2017-11-24 NOTE — ED PROVIDER NOTE - OBJECTIVE STATEMENT
pt c/o sob, wheeze. pt reports used new pillow last night, diff breathing worsened throughout the night. pt was tachypneic and wheezing upon arrival in er, improved after neb given, now comfortable. pt denies fever, chills, ha, d/n/v, cp, cough, abd pain, edema, calf pain.  pmd - hen  dimitrios kaminski

## 2017-12-12 NOTE — ED ADULT NURSE NOTE - BOWEL SOUNDS RLQ
After Visit Summary   12/12/2017    Cayla Timmons    MRN: [id number]           Patient Information     Date Of Birth          1988        Visit Information        Provider Department      12/12/2017 7:00 AM Magdaleno Winston MD [facility]        Today's Diagnoses     Palpitations    -  1    Heart murmur        SOB (shortness of breath)        Atypical chest pain        Pregnant state, incidental           Follow-ups after your visit        Follow-up notes from your care team     Return in about 4 weeks (around 1/9/2018).      Your next 10 appointments already scheduled     Dec 18, 2017  7:00 AM CST   Lab with  LAB    [facility] ([facility])    [address] [place] [id number]   [phone]            Dec 18, 2017  7:30 AM CST   PFT VISIT with  PFL A   [facility] Function Testing ([facility])    [address] [place] [id number]   [phone]            Dec 18, 2017  8:00 AM CST   Ech Complete with [id number]   [facility] ([facility])    [address] [place] [id number] [phone]           1. Please bring or wear a comfortable two-piece outfit. 2. You may eat, drink and take your normal medicines. 3. For any questions that cannot be answered, please contact the ordering physician            Dec 18, 2017  9:00 AM CST   Ech Stress Test with ADRIA  STRESS ROOM   [facility] ([facility])    [address] [place] [id number] [phone]           1. Please bring or wear a comfortable two-piece outfit and walking shoes. 2. Stop eating 3 hours before the test. You may drink water or juice. 3. Stop all caffeine 12 hours before the test. This includes coffee, tea, soda pop, chocolate and certain medicines (such as Anacin and Excederin). Also avoid decaf coffee  and tea, as these contain small amounts of caffeine. 4. No alcohol, smoking or use of other tobacco products for 12 hours before the test. 5. Refer to your provider instructions to see if you need to stop any medications (such as beta-blockers or nitrates) for this test. 6. For patients with diabetes: - If you take insulin, call your diabetes care team. Ask if you should take a   dose the morning of your test. - If you take diabetes medicine by mouth, dont take it on the morning of your test. Bring it with you to take after the test. (If you have questions, call your diabetes care team) 7. When you arrive, please tell us if: - You have diabetes. - You have taken Viagra, Cialis or Levitra in the past 48 hours. 8. For any questions that cannot be answered, please contact the ordering physician            Dec 18, 2017 10:00 AM CST   (Arrive by 9:45 AM)   Event Monitor Visit with  Cvc Monitor [facility] ([facility])    [address] [place] [phone] [phone]            Dec 21, 2017  4:00 PM CST   (Arrive by 3:45 PM)   New Patient Visit with Sonido Pemberton MD   [facility] ([facility])    [address] [place] [phone]            Jan 23, 2018  7:00 AM CST   (Arrive by 6:45 AM)   Return Visit with Magdaleno Winston MD   [facility] ([facility])    [address] [place] [phone]              Future tests that were ordered for you today     Open Standing Orders        Priority Remaining Interval Expires Ordered    Lyme Disease Morena with reflex to WB Serum Routine 1/1 AM DRAW  12/12/2017          Open Future Orders        Priority Expected Expires Ordered    Echocardiogram Complete Routine  12/12/2018 12/12/2017    Exercise Stress Echocardiogram Routine  12/12/2018 12/12/2017     "Cardiac Event Monitor - (Zio Patch) Routine 12/19/2017 3/19/2018 12/12/2017    General PFT Lab (Please always keep checked) Routine  12/12/2018 12/12/2017    Blood gas arterial (PH, WY, SH, RH, UU, HI, UR) Routine  12/12/2018 12/12/2017    Pulmonary Function Test Routine  2/28/2027 12/12/2017            Who to contact     If you have questions or need follow up information about today's clinic visit or your schedule please contact [facility] directly at [phone].  Normal or non-critical lab and imaging results will be communicated to you by [employer], letter or phone within 4 business days after the clinic has received the results. If you do not hear from us within 7 days, please contact the clinic through [employer] or phone. If you have a critical or abnormal lab result, we will notify you by phone as soon as possible.  Submit refill requests through [employer] or call your pharmacy and they will forward the refill request to us. Please allow 3 business days for your refill to be completed.          Additional Information About Your Visit        MyChart Information     [employer] gives you secure access to your electronic health record. If you see a primary care provider, you can also send messages to your care team and make appointments. If you have questions, please call your primary care clinic.  If you do not have a primary care provider, please call [phone] and they will assist you.        Care EveryWhere ID     This is your Care EveryWhere ID. This could be used by other organizations to access your [facility] medical records  [id number]        Your Vitals Were     Pulse Height Pulse Oximetry BMI (Body Mass Index)          101 1.6 m (5' 3\") 98% 21.36 kg/m2         Blood Pressure from Last 3 Encounters:   12/12/17 105/68   06/11/13 98/68   06/02/13 111/76    Weight from Last 3 Encounters:   12/12/17 54.7 kg (120 lb 9.6 oz)   06/11/13 63.7 kg (140 lb 8 oz)   06/02/13 72.6 kg (160 lb)              We " Performed the Following     EKG 12-lead, tracing only (Future)          Today's Medication Changes          These changes are accurate as of: 12/12/17  8:19 AM.  If you have any questions, ask your nurse or doctor.               Stop taking these medicines if you haven't already. Please contact your care team if you have questions.     acetaminophen 500 MG tablet   Commonly known as:  TYLENOL   Stopped by:  Magdaleno Winston MD           CELEXA PO   Stopped by:  Magdaleno Winston MD           HYDROcodone-acetaminophen 5-325 MG per tablet   Commonly known as:  NORCO   Stopped by:  Magdaleno Winston MD           ibuprofen 200 MG tablet   Commonly known as:  ADVIL/MOTRIN   Stopped by:  Magdaleno Winston MD                    Primary Care Provider Fax #    Physician No Ref-Primary [phone]       No address on file        Equal Access to Services     [facility]: Lyudmila Celis, waaxdania luqadaha, qaybta kaalmadania simon, maria guadalupe simon . So [facility] [phone].    ATENCIÓN: Si habla español, tiene a jha disposición servicios gratuitos de asistencia lingüística. Llame al [phone].    We comply with applicable federal civil rights laws and [place] laws. We do not discriminate on the basis of race, color, national origin, age, disability, sex, sexual orientation, or gender identity.            Thank you!     Thank you for choosing [facility]  for your care. Our goal is always to provide you with excellent care. Hearing back from our patients is one way we can continue to improve our services. Please take a few minutes to complete the written survey that you may receive in the mail after your visit with us. Thank you!             Your Updated Medication List - Protect others around you: Learn how to safely use, store and throw away your medicines at www.disposemymeds.org.          This list is accurate as of: 12/12/17  8:19 AM.  Always use your most recent med list.                    Brand Name Dispense Instructions for use Diagnosis    DOCOSAHEXAENOIC ACID PO      as needed        DULERA 100-5 MCG/ACT oral inhaler   Generic drug:  mometasone-formoterol           LORazepam 0.5 MG tablet    ATIVAN     as needed        Mag Glycinate 100 MG Tabs           PRENATAL PO           prochlorperazine 10 MG tablet    COMPAZINE          pyridoxine 100 MG tablet    VITAMIN B-6          sertraline 50 MG tablet    ZOLOFT          UNISOM SLEEPGELS 50 MG Caps   Generic drug:  DiphenhydrAMINE HCl (Sleep)           * VENTOLIN  (90 BASE) MCG/ACT Inhaler   Generic drug:  albuterol           * PROAIR RESPICLICK 108 (90 BASE) MCG/ACT Aepb   Generic drug:  Albuterol Sulfate      INL 2 PFS PO Q 4 H        Vitamin D-3 5000 UNITS Tabs           * Notice:  This list has 2 medication(s) that are the same as other medications prescribed for you. Read the directions carefully, and ask your doctor or other care provider to review them with you.       present

## 2021-08-08 NOTE — ED ADULT NURSE NOTE - CAS EDN DISCHARGE INTERVENTIONS
You can access the FollowMyHealth Patient Portal offered by [facility] by registering at the following website: [email or web address]. By joining [employer]’s FollowMyHealth portal, you will also be able to view your health information using other applications (apps) compatible with our system.
Arm band on/IV intact

## 2021-09-03 NOTE — ED ADULT NURSE NOTE - PLAN OF CARE
Bedside visitors/Position of comfort/Call bell/Explanation of exam/test Paramedian Forehead Flap Text: A decision was made to reconstruct the defect utilizing an interpolation axial flap and a staged reconstruction.  A telfa template was made of the defect.  This telfa template was then used to outline the paramedian forehead pedicle flap.  The donor area for the pedicle flap was then injected with anesthesia.  The flap was excised through the skin and subcutaneous tissue down to the layer of the underlying musculature.  The pedicle flap was carefully excised within this deep plane to maintain its blood supply.  The edges of the donor site were undermined.   The donor site was closed in a primary fashion.  The pedicle was then rotated into position and sutured.  Once the tube was sutured into place, adequate blood supply was confirmed with blanching and refill.  The pedicle was then wrapped with xeroform gauze and dressed appropriately with a telfa and gauze bandage to ensure continued blood supply and protect the attached pedicle.

## 2022-04-19 NOTE — PROGRESS NOTE ADULT - I WAS PHYSICALLY PRESENT FOR THE KEY PORTIONS OF THE EVALUATION AND MANAGEMENT (E/M) SERVICE PROVIDED.  I AGREE WITH THE ABOVE HISTORY, PHYSICAL, AND PLAN WHICH I HAVE REVIEWED AND EDITED WHERE APPROPRIATE
Statement Selected
Post-Care Instructions: I reviewed with the patient in detail post-care instructions. Patient is to wear sunprotection, and avoid picking at any of the treated lesions. Pt may apply Vaseline to crusted or scabbing areas.
Statement Selected
Render Post-Care Instructions In Note?: no
Consent: The patient's consent was obtained including but not limited to risks of crusting, scabbing, blistering, scarring, darker or lighter pigmentary change, recurrence, incomplete removal and infection.
Show Applicator Variable?: Yes
Detail Level: Detailed
Duration Of Freeze Thaw-Cycle (Seconds): 0

## 2023-06-09 NOTE — ED ADULT NURSE NOTE - CHIEF COMPLAINT
The patient is a 77y Female complaining of difficulty breathing. [FreeTextEntry2] : New Patient - Left Shoulder

## 2023-08-30 NOTE — ED PROVIDER NOTE - MEDICAL DECISION MAKING DETAILS
Alert-The patient is alert, awake and responds to voice. The patient is oriented to time, place, and person. The triage nurse is able to obtain subjective information. pt with sob ans wheeze this am - ekg/xr/labs/lasix/cards

## 2023-10-14 NOTE — PATIENT PROFILE ADULT. - CURRENT SWALLOWING
[facility] EMERGENCY DEPT  EMERGENCY DEPARTMENT HISTORY AND PHYSICAL EXAM      Date: 10/14/2023  Patient Name: Yamila Panchal  MRN: [id number]  [address] [facility] 1957  Date of evaluation: 10/14/2023  Provider: Heaven Johnson MD   Note Started: 5:05 PM EDT 10/14/23    HISTORY OF PRESENT ILLNESS     Chief Complaint   Patient presents with    Nstemi       History Provided By: Patient    HPI: Yamila Panchal is a 74 y.o. female presents to emergency department as a transfer from [facility] for evaluation of EKG changes concerning for possible MI, patient reportedly had a syncopal episode at home, has been feeling very weak recently, on arrival to the emergency department noted to have ST elevations in leads II, III, aVF and 456. Patient denied any chest pain shortness of breath. Contacted Dr. Yaima Alatorre, who reviewed EKG, did not feel the patient warranted STEMI alert given [employer] localized findings on EKG and no active chest pain, agreed with transfer however not as a STEMI alert. Currently patient states she feels weak, denies any chest pain shortness of breath no recent fevers chills no cough. PAST MEDICAL HISTORY   Past Medical History:  No past medical history on file. Past Surgical History:  No past surgical history on file. Family History:  No family history on file. Social History:        Allergies:  No Known Allergies    PCP: Nancy Wetzel MD    Current Meds:   Current Facility-Administered Medications   Medication Dose Route Frequency Provider Last Rate Last Admin    heparin (porcine) injection 2,990 Units  60 Units/kg IntraVENous PRN Jennifer Reid MD        heparin (porcine) injection 1,500 Units  30 Units/kg IntraVENous PRN Jennifer Reid MD        heparin 25,000 units in dextrose 5% 250 mL (premix) infusion  5-30 Units/kg/hr IntraVENous Continuous Jennifer Reid MD 6 mL/hr at 10/14/23 1547 12 Units/kg/hr at 10/14/23 1547    norepinephrine (LEVOPHED) 16 mg in sodium chloride 0.9 % 250 mL (0) swallows foods and liquids w/o difficulty

## 2023-11-13 ENCOUNTER — APPOINTMENT (OUTPATIENT)
Dept: ORTHOPEDIC SURGERY | Facility: CLINIC | Age: 83
End: 2023-11-13
Payer: MEDICARE

## 2023-11-13 VITALS — BODY MASS INDEX: 16.07 KG/M2 | HEIGHT: 66 IN | WEIGHT: 100 LBS

## 2023-11-13 DIAGNOSIS — S43.014S ANTERIOR DISLOCATION OF RIGHT HUMERUS, SEQUELA: ICD-10-CM

## 2023-11-13 DIAGNOSIS — Z86.59 PERSONAL HISTORY OF OTHER MENTAL AND BEHAVIORAL DISORDERS: ICD-10-CM

## 2023-11-13 DIAGNOSIS — Z86.79 PERSONAL HISTORY OF OTHER DISEASES OF THE CIRCULATORY SYSTEM: ICD-10-CM

## 2023-11-13 DIAGNOSIS — S43.005A UNSPECIFIED DISLOCATION OF LEFT SHOULDER JOINT, INITIAL ENCOUNTER: ICD-10-CM

## 2023-11-13 DIAGNOSIS — M89.8X9 OTHER SPECIFIED DISORDERS OF BONE, UNSPECIFIED SITE: ICD-10-CM

## 2023-11-13 DIAGNOSIS — Z87.891 PERSONAL HISTORY OF NICOTINE DEPENDENCE: ICD-10-CM

## 2023-11-13 PROCEDURE — 99214 OFFICE O/P EST MOD 30 MIN: CPT

## 2023-11-13 PROCEDURE — 73010 X-RAY EXAM OF SHOULDER BLADE: CPT | Mod: RT

## 2023-11-13 PROCEDURE — 73030 X-RAY EXAM OF SHOULDER: CPT | Mod: RT

## 2023-11-13 RX ORDER — HYDROCODONE BITARTRATE AND ACETAMINOPHEN 7.5; 325 MG/1; MG/1
TABLET ORAL
Refills: 0 | Status: ACTIVE | COMMUNITY

## 2023-11-13 RX ORDER — DIPHENOXYLATE HYDROCHLORIDE AND ATROPINE SULFATE 2.5; .025 MG/1; MG/1
TABLET ORAL
Refills: 0 | Status: ACTIVE | COMMUNITY

## 2023-11-13 RX ORDER — RISPERIDONE 0.5 MG/1
TABLET ORAL
Refills: 0 | Status: ACTIVE | COMMUNITY

## 2024-03-18 NOTE — PATIENT PROFILE ADULT. - PAIN RATING AT ACTIVITY
[FreeTextEntry1] : 16 y/o female referred to rheumatology for joint pains. Pt follows with hematology for iron deficiency anemia and receiving infusions. Pt reports easy bruising. Heme reportedly did not see lab results showing any underlying disease that can cause bruising. Pt reports b/l hands and wrists pains with cracking. Pains described as sharp that moves up and down the hands. Denies joint swelling, redness, warmth. Pt reports potentially has Raynaud's, changes color to purple with pain in the cold temperature. Denies digital tip ulcers. Reports fatigue. Denies rashes, oral ulcers, hair loss, sicca symptoms. Denies family Hx of autoimmune diseases. Pt is a .  Patient has significant Raynaud's with rare dilated nailfold capillaries. 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  - Obtain labwork to evaluate for signs of systemic autoimmune diseases - Rx amlodipine 2.5mg PO daily for significant Raynaud's symptoms. Advised to watch for symptoms of hypotension, LE swelling. Advised to call if would like to try higher dose (up to 20mg/day) - Patient was counselled about Raynaud precautions, including dressing more warmly, avoidance of prolonged cold exposure, stress, and tobacco exposure. - Advised to watch for any concerning symptoms and to let me know between appointments. - Will contact pt with results for any further workup between appointments. RTC 6 months for follow up.   5

## 2025-05-13 NOTE — ED PROVIDER NOTE - CHPI ED SYMPTOMS NEG
IT WAS GREAT TO SEE YOU TODAY!    I WILL CONTACT YOU WITH THE RESULTS OF YOUR LABS.    PLEASE TAKE ALL MEDICATION AS DISCUSSED.    I WILL SEE YOU AGAIN IN 3 WEEKS/MONTHS BUT PLEASE CALL WITH CONCERNS [phone]    
no chills/no fever/no edema/no chest pain/no cough